# Patient Record
Sex: MALE | Race: WHITE
[De-identification: names, ages, dates, MRNs, and addresses within clinical notes are randomized per-mention and may not be internally consistent; named-entity substitution may affect disease eponyms.]

---

## 2017-12-06 ENCOUNTER — HOSPITAL ENCOUNTER (INPATIENT)
Dept: HOSPITAL 17 - NEPE | Age: 82
LOS: 3 days | Discharge: NURSING, REHAB OR LONG-TERM CARE | DRG: 470 | End: 2017-12-09
Attending: HOSPITALIST | Admitting: HOSPITALIST
Payer: SELF-PAY

## 2017-12-06 VITALS — HEIGHT: 68 IN | BODY MASS INDEX: 19.01 KG/M2 | WEIGHT: 125.44 LBS

## 2017-12-06 VITALS
RESPIRATION RATE: 18 BRPM | HEART RATE: 77 BPM | OXYGEN SATURATION: 100 % | SYSTOLIC BLOOD PRESSURE: 142 MMHG | DIASTOLIC BLOOD PRESSURE: 71 MMHG

## 2017-12-06 VITALS — HEART RATE: 96 BPM

## 2017-12-06 DIAGNOSIS — S72.012A: Primary | ICD-10-CM

## 2017-12-06 DIAGNOSIS — F03.90: ICD-10-CM

## 2017-12-06 DIAGNOSIS — Z91.81: ICD-10-CM

## 2017-12-06 DIAGNOSIS — Z95.1: ICD-10-CM

## 2017-12-06 DIAGNOSIS — I48.1: ICD-10-CM

## 2017-12-06 DIAGNOSIS — Z86.73: ICD-10-CM

## 2017-12-06 LAB
ANION GAP SERPL CALC-SCNC: 7 MEQ/L (ref 5–15)
BASOPHILS # BLD AUTO: 0.1 TH/MM3 (ref 0–0.2)
BASOPHILS NFR BLD: 2.3 % (ref 0–2)
BUN SERPL-MCNC: 16 MG/DL (ref 7–18)
CHLORIDE SERPL-SCNC: 107 MEQ/L (ref 98–107)
EOSINOPHIL # BLD: 0.3 TH/MM3 (ref 0–0.4)
EOSINOPHIL NFR BLD: 5.2 % (ref 0–4)
ERYTHROCYTE [DISTWIDTH] IN BLOOD BY AUTOMATED COUNT: 14.8 % (ref 11.6–17.2)
GFR SERPLBLD BASED ON 1.73 SQ M-ARVRAT: 74 ML/MIN (ref 89–?)
HCO3 BLD-SCNC: 28.2 MEQ/L (ref 21–32)
HCT VFR BLD CALC: 32.5 % (ref 39–51)
HEMO FLAGS: (no result)
INR PPP: 1.1 RATIO
LYMPHOCYTES # BLD AUTO: 1.6 TH/MM3 (ref 1–4.8)
LYMPHOCYTES NFR BLD AUTO: 26.1 % (ref 9–44)
MCH RBC QN AUTO: 30.9 PG (ref 27–34)
MCHC RBC AUTO-ENTMCNC: 33.2 % (ref 32–36)
MCV RBC AUTO: 92.9 FL (ref 80–100)
MONOCYTES NFR BLD: 10.6 % (ref 0–8)
NEUTROPHILS # BLD AUTO: 3.4 TH/MM3 (ref 1.8–7.7)
NEUTROPHILS NFR BLD AUTO: 55.8 % (ref 16–70)
PLATELET # BLD: 216 TH/MM3 (ref 150–450)
POTASSIUM SERPL-SCNC: 4.6 MEQ/L (ref 3.5–5.1)
PROTHROMBIN TIME: 11.3 SEC (ref 9.8–11.6)
RBC # BLD AUTO: 3.49 MIL/MM3 (ref 4.5–5.9)
SODIUM SERPL-SCNC: 142 MEQ/L (ref 136–145)
WBC # BLD AUTO: 6.1 TH/MM3 (ref 4–11)

## 2017-12-06 PROCEDURE — 88305 TISSUE EXAM BY PATHOLOGIST: CPT

## 2017-12-06 PROCEDURE — 80048 BASIC METABOLIC PNL TOTAL CA: CPT

## 2017-12-06 PROCEDURE — 70450 CT HEAD/BRAIN W/O DYE: CPT

## 2017-12-06 PROCEDURE — 71010: CPT

## 2017-12-06 PROCEDURE — 85018 HEMOGLOBIN: CPT

## 2017-12-06 PROCEDURE — 86900 BLOOD TYPING SEROLOGIC ABO: CPT

## 2017-12-06 PROCEDURE — L1830 KO IMMOB CANVAS LONG PRE OTS: HCPCS

## 2017-12-06 PROCEDURE — 73502 X-RAY EXAM HIP UNI 2-3 VIEWS: CPT

## 2017-12-06 PROCEDURE — 82306 VITAMIN D 25 HYDROXY: CPT

## 2017-12-06 PROCEDURE — 93005 ELECTROCARDIOGRAM TRACING: CPT

## 2017-12-06 PROCEDURE — 86901 BLOOD TYPING SEROLOGIC RH(D): CPT

## 2017-12-06 PROCEDURE — 88311 DECALCIFY TISSUE: CPT

## 2017-12-06 PROCEDURE — 85014 HEMATOCRIT: CPT

## 2017-12-06 PROCEDURE — 85025 COMPLETE CBC W/AUTO DIFF WBC: CPT

## 2017-12-06 PROCEDURE — 85610 PROTHROMBIN TIME: CPT

## 2017-12-06 PROCEDURE — 86850 RBC ANTIBODY SCREEN: CPT

## 2017-12-06 RX ADMIN — Medication SCH ML: at 21:23

## 2017-12-06 NOTE — RADRPT
EXAM DATE/TIME:  12/06/2017 19:32 

 

[facility] COMPARISON:     

No previous studies available for comparison.

 

                     

INDICATIONS :     

Chest pain. 

                     

 

MEDICAL HISTORY :     

None.          

 

SURGICAL HISTORY :     

CABG.   

 

ENCOUNTER:     

Initial                                        

 

ACUITY:     

1 day      

 

PAIN SCORE:     

1/10

 

LOCATION:     

Bilateral chest 

 

FINDINGS:     

The heart is enlarged. Median sternotomy wires are noted status post cardiac surgery. The pulmonary v
ascular pattern is normal. The lungs are clear.

 

CONCLUSION:     

1. Cardiomegaly. 

2. No focal infiltrate or pulmonary vascular congestion. 

 

 

 Nigel Siddiqui MD on December 06, 2017 at 19:48           

Board Certified Radiologist.

 This report was verified electronically.
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
## 2017-12-06 NOTE — RADRPT
EXAM DATE/TIME:  12/06/2017 19:29 

 

[facility] COMPARISON:     

No previous studies available for comparison.

 

                     

INDICATIONS :     

Left hip pain. Unknown cause of injury. 

                     

 

MEDICAL HISTORY :     

None.          

 

SURGICAL HISTORY :     

CABG.   

 

ENCOUNTER:     

Initial                                        

 

ACUITY:     

1 day      

 

PAIN SCORE:     

10/10

 

LOCATION:     

Left  hip. 

 

FINDINGS:     

There is an acute subcapital fracture involving the left proximal femur. Aortic stent graft is noted.


 

CONCLUSION:     Acute subcapital fracture involving the left proximal femur. 

 

 

 Nigel Siddiqui MD on December 06, 2017 at 19:49           

Board Certified Radiologist.

 This report was verified electronically.

## 2017-12-06 NOTE — RADRPT
EXAM DATE/TIME:  12/06/2017 20:06 

 

[facility] COMPARISON:     

No previous studies available for comparison.

 

 

INDICATIONS :     

Altered mental status.

                      

 

RADIATION DOSE:     

56.35 CTDIvol (mGy) 

 

 

 

MEDICAL HISTORY :     

None  

 

SURGICAL HISTORY :      

None. 

 

ENCOUNTER:      

Initial

 

ACUITY:      

1 day

 

PAIN SCALE:      

0/10

 

LOCATION:        

cranial 

 

TECHNIQUE:     

Multiple contiguous axial images were obtained of the head.  Using automated exposure control and adj
ustment of the mA and/or kV according to patient size, radiation dose was kept as low as reasonably a
chievable to obtain optimal diagnostic quality images.   DICOM format image data is available electro
nically for review and comparison.  

 

FINDINGS:     

 

CEREBRUM:     

Diffuse cerebral atrophy is noted. Moderate periventricular and subcortical white matter small vessel
 ischemic changes are noted bilaterally. 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 No
 evidence of midline shift, mass lesion, hemorrhage or acute infarction.  No extra-axial fluid collec
tions are seen.

 

POSTERIOR FOSSA:     

The cerebellum and brainstem are intact.  The 4th ventricle is midline.  The cerebellopontine angle i
s unremarkable.

 

EXTRACRANIAL:     

The visualized portion of the orbits is intact.

 

SKULL:     

The calvaria is intact.  No evidence of skull fracture.

 

CONCLUSION:     

1. Diffuse cerebral atrophy. 

2. Moderate periventricular and subcortical white matter small vessel ischemic changes bilaterally. 

3. 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 

4. No acute infarct, acute hemorrhage, mass effect or extra axial fluid collections. 

 

 

 Nigel Siddiqui MD on December 06, 2017 at 20:20           

Board Certified Radiologist.

 This report was verified electronically.

## 2017-12-07 VITALS
SYSTOLIC BLOOD PRESSURE: 110 MMHG | OXYGEN SATURATION: 98 % | TEMPERATURE: 95.2 F | RESPIRATION RATE: 17 BRPM | DIASTOLIC BLOOD PRESSURE: 72 MMHG | HEART RATE: 94 BPM

## 2017-12-07 VITALS
RESPIRATION RATE: 18 BRPM | SYSTOLIC BLOOD PRESSURE: 125 MMHG | OXYGEN SATURATION: 96 % | TEMPERATURE: 96.8 F | DIASTOLIC BLOOD PRESSURE: 75 MMHG | HEART RATE: 96 BPM

## 2017-12-07 VITALS
SYSTOLIC BLOOD PRESSURE: 138 MMHG | RESPIRATION RATE: 17 BRPM | HEART RATE: 137 BPM | OXYGEN SATURATION: 94 % | TEMPERATURE: 96.3 F | DIASTOLIC BLOOD PRESSURE: 72 MMHG

## 2017-12-07 VITALS
HEART RATE: 76 BPM | DIASTOLIC BLOOD PRESSURE: 59 MMHG | TEMPERATURE: 96 F | OXYGEN SATURATION: 99 % | SYSTOLIC BLOOD PRESSURE: 107 MMHG | RESPIRATION RATE: 17 BRPM

## 2017-12-07 VITALS
SYSTOLIC BLOOD PRESSURE: 118 MMHG | TEMPERATURE: 96.7 F | OXYGEN SATURATION: 96 % | HEART RATE: 89 BPM | DIASTOLIC BLOOD PRESSURE: 74 MMHG | RESPIRATION RATE: 18 BRPM

## 2017-12-07 VITALS
DIASTOLIC BLOOD PRESSURE: 69 MMHG | HEART RATE: 98 BPM | SYSTOLIC BLOOD PRESSURE: 115 MMHG | OXYGEN SATURATION: 95 % | RESPIRATION RATE: 18 BRPM | TEMPERATURE: 98.8 F

## 2017-12-07 LAB
ANION GAP SERPL CALC-SCNC: 7 MEQ/L (ref 5–15)
BASOPHILS # BLD AUTO: 0.1 TH/MM3 (ref 0–0.2)
BASOPHILS NFR BLD: 1.7 % (ref 0–2)
BUN SERPL-MCNC: 12 MG/DL (ref 7–18)
CHLORIDE SERPL-SCNC: 109 MEQ/L (ref 98–107)
EOSINOPHIL # BLD: 0.4 TH/MM3 (ref 0–0.4)
EOSINOPHIL NFR BLD: 6.6 % (ref 0–4)
ERYTHROCYTE [DISTWIDTH] IN BLOOD BY AUTOMATED COUNT: 15.2 % (ref 11.6–17.2)
GFR SERPLBLD BASED ON 1.73 SQ M-ARVRAT: 106 ML/MIN (ref 89–?)
HCO3 BLD-SCNC: 26.3 MEQ/L (ref 21–32)
HCT VFR BLD CALC: 29.1 % (ref 39–51)
HEMO FLAGS: (no result)
LYMPHOCYTES # BLD AUTO: 1.1 TH/MM3 (ref 1–4.8)
LYMPHOCYTES NFR BLD AUTO: 20.9 % (ref 9–44)
MCH RBC QN AUTO: 32 PG (ref 27–34)
MCHC RBC AUTO-ENTMCNC: 34.5 % (ref 32–36)
MCV RBC AUTO: 92.8 FL (ref 80–100)
MONOCYTES NFR BLD: 8.9 % (ref 0–8)
NEUTROPHILS # BLD AUTO: 3.3 TH/MM3 (ref 1.8–7.7)
NEUTROPHILS NFR BLD AUTO: 61.9 % (ref 16–70)
PLATELET # BLD: 188 TH/MM3 (ref 150–450)
POTASSIUM SERPL-SCNC: 3.9 MEQ/L (ref 3.5–5.1)
RBC # BLD AUTO: 3.14 MIL/MM3 (ref 4.5–5.9)
SODIUM SERPL-SCNC: 142 MEQ/L (ref 136–145)
WBC # BLD AUTO: 5.4 TH/MM3 (ref 4–11)

## 2017-12-07 PROCEDURE — 0SRB01A REPLACEMENT OF LEFT HIP JOINT WITH METAL SYNTHETIC SUBSTITUTE, UNCEMENTED, OPEN APPROACH: ICD-10-PCS | Performed by: ORTHOPAEDIC SURGERY

## 2017-12-07 RX ADMIN — CLINDAMYCIN PHOSPHATE SCH MLS/HR: 150 INJECTION, SOLUTION INTRAMUSCULAR; INTRAVENOUS at 18:05

## 2017-12-07 RX ADMIN — Medication SCH ML: at 09:00

## 2017-12-07 RX ADMIN — METOPROLOL TARTRATE SCH MG: 25 TABLET, FILM COATED ORAL at 20:37

## 2017-12-07 RX ADMIN — Medication SCH ML: at 20:44

## 2017-12-07 RX ADMIN — SENNOSIDES SCH MG: 8.6 TABLET, FILM COATED ORAL at 20:38

## 2017-12-07 RX ADMIN — CALCIUM CARBONATE-CHOLECALCIFEROL TAB 250 MG-125 UNIT SCH MG: 250-125 TAB at 20:38

## 2017-12-07 RX ADMIN — MIRTAZAPINE SCH MG: 15 TABLET, FILM COATED ORAL at 20:38

## 2017-12-07 RX ADMIN — HYDROCODONE BITARTRATE AND ACETAMINOPHEN PRN TAB: 5; 325 TABLET ORAL at 16:22

## 2017-12-07 RX ADMIN — DONEPEZIL HYDROCHLORIDE SCH MG: 5 TABLET, FILM COATED ORAL at 20:39

## 2017-12-07 NOTE — RADRPT
EXAM DATE/TIME:  12/07/2017 12:56 

 

[facility] COMPARISON:     HIP LEFT (AP&LAT 2/3VWS) W AP PELVIS, December 06, 2017, 19:29.

 

                     

INDICATIONS :     Post op left hip surgery

                     

MEDICAL HISTORY :     None.          

SURGICAL HISTORY :     None.   

ENCOUNTER:     Initial                                        

ACUITY:     1 day      

PAIN SCORE:     0/10

LOCATION:     Left  hip and pelvis

 

FINDINGS:

Left hip arthroplasty is present. The alignment is anatomic. There is no evidence of acute fracture. 
 

 

CONCLUSION:     Postsurgical changes as above.

 

 Fco Cotto MD on December 07, 2017 at 16:15           

Board Certified Radiologist.

 This report was verified electronically.

## 2017-12-07 NOTE — HHI.PR
Addendum to Inpatient Note


Addendum Reason:  Additional Documentation


Additional Information


Patient is very confused and demented. as per documentation, the patient was 

dropped into [facility] for evaluation by DCF. At the moment the 

patient is very confused, denies any prior medical history although he has a 

visible midline sternal scar.  Lungs are clear to auscultation bilaterally, 

abdomen is soft, nontender, nondistended.  There is pain elicited upon 

palpation of the left hip.  The patient has a subcapital left femoral fracture 

for which orthopedic surgery has been consulted and recommended surgical 

repair.  Given the high morbidity and mortality associated with hip fractures, 

it is of medical necessity for the patient to have the fracture repaired.  

Signed consent on behalf of the patient.











Jules Tuttle MD Dec 7, 2017 09:10

## 2017-12-07 NOTE — PD.ORT.PN
Subjective


Subjective Remarks


Fall at skilled nursing facility. Left hip pain and is unable to ambulate. 

Patient is very confused





Objective


Vitals





Vital Signs








  Date Time  Temp Pulse Resp B/P (MAP) Pulse Ox O2 Delivery O2 Flow Rate FiO2


 


12/7/17 04:25 96.7 89 18 118/74 (89) 96   


 


12/7/17 00:15 96.8 96 18 125/75 (92) 96   


 


12/7/17 00:06        


 


12/6/17 20:01  77 18 145/71 (95) 100 Room Air  





    142/68 (92)    


 


12/6/17 20:01     100 Room Air  


 


12/6/17 20:01     100 Room Air  














I/O      


 


 12/6/17 12/6/17 12/6/17 12/7/17 12/7/17 12/7/17





 07:00 15:00 23:00 07:00 15:00 23:00


 


Intake Total   500 ml 0 ml  


 


Balance   500 ml 0 ml  


 


      


 


Intake Oral    0 ml  


 


IV Total   500 ml   


 


# Voids    2  


 


# Bowel Movements    1  








Result Diagram:  


12/6/17 2000 12/6/17 2000





Other Results





Laboratory Tests








Test


  12/6/17


20:00


 


Prothromb Time International


Ratio 1.1 RATIO 


 


 


Prothrombin Time


  11.3 SEC


(9.8-11.6)








Imaging





Last 24 hours Impressions








Chest X-Ray 12/6/17 1910 Signed





Impressions: 





 Service Date/Time:  Wednesday, December 6, 2017 19:32 - CONCLUSION:  1. 





 Cardiomegaly.  2. No focal infiltrate or pulmonary vascular congestion.     





 Nigel Siddiqui MD 








Objective Remarks


Bilateral upper extremities: Full range of motion neurovascularly intact


Right lower extremity: Full range of motion neurovascularly intact


Left lower extremity: Pain to palpation of hip. No pain with knee or ankle 

motion. Still has house identification band on left leg area distally intact 

sensation good capillary refills





Assessment & Plan


Assessment and Plan


Left femoral neck fracture


Nothing by mouth


Surgery this morning for left hip hemiarthroplasty


Nursing to find consents. May need secondary DrAlfonzo consents for medical 

necessary. Surgery this morning with Dr. Rama Huber,Mina CORTEZ Dec 7, 2017 06:50

## 2017-12-07 NOTE — MB
cc:

MARKO SALAZAR

****

 

 

DATE OF CONSULTATION

12/7/2017

 

REASON FOR CONSULTATION

Displaced left femoral neck fracture.

 

CONSULTING PHYSICIAN

Dr. Chester

 

CITLALLI Manning is an 89-year-old male who had been living at home alone.  He was

subsequently taken to a rehab center recently because of difficulty managing

himself at home.  He does have some possible early dementia.  The patient was

found to have left hip pain with some deformity of his left leg.  He was

brought to the emergency room.  He was found to have a displaced left femoral

neck fracture.  He complains of left hip pain.  Pain is worse with movement.

He has had difficulty ambulating.

 

PAST MEDICAL HISTORY

Past medical history from the patient is unreliable.  He has some confusion.

He denies any significant medical problems.

 

SURGERIES

The patient does not recall any surgery.

 

ALLERGIES

PENICILLIN

 

MEDICATIONS

Please see EMR for inpatient medications.

 

SOCIAL HISTORY

[place] originally lived in [place] The patient denies

alcohol, tobacco or drug use.

 

FAMILY HISTORY

Positive for asthma in a brother.

 

REVIEW OF SYSTEMS

The patient denies headache, visual changes, neck pain, chest pain, shortness

of breath, abdominal pain, nausea, vomiting or recent weight loss, bowel or

bladder incontinence, numbness or  tingling of extremities.  He complains of

left hip pain.  He has difficulty ambulating secondary to his hip pain.

 

PHYSICAL EXAMINATION

The patient is a thin 89-year-old male.  He is awake.  He answers questions,

but his answers are not completely reliable.  He appears well-developed,

well-nourished.

VITAL SIGNS:  Temperature 96.0, pulse 76, respirations 17, blood pressure

107/59, O2 sat 99% on room air.

HEAD:  The patient is normocephalic.

EYES:  Pupils are equal.

NECK:  Soft, nontender.  Trachea is midline.

ABDOMEN:  Soft, nontender, nondistended.

EXTREMITIES:  Examination of the bilateral upper extremities reveals no pain

with shoulder, elbow or wrist motion.  He has intact sensation in all fingers.

He has good cap refill in all fingers.  Skin is intact.  Radial pulses are

palpable.  Examination of the right leg reveals no pain with hip, knee or ankle

motion.  Skin is intact.  Dorsalis pedis pulse is palpable.  Sensation intact.

Examination of the left leg reveals that the leg is shortened.  He has pain

with any hip motion.  He has minimal tenderness around his knee, tibia, or

ankle.  Skin is intact.  Dorsalis pulse is palpable.

 

X-RAYS

X-rays of left hip were reviewed.  X-rays reveal a displaced left femoral neck

fracture.

 

IMPRESSION

1. Displaced left femoral neck fracture.

2. Early dementia.

 

PLAN

Treatment options were discussed with the patient.  At this point, I would

recommend left hip hemiarthroplasty.  The risks of surgery include bleeding,

infection, injury to arteries, nerves and blood vessels, hip dislocation, leg

length discrepancy, femur fracture, as well as medical complications including

blood clot, stroke, heart attack and death.  All questions were answered.  I

will plan on surgery today.

 

A mid-level provider in my office, nurse practitioner or PA, may see this

patient on a follow-up basis and continue to implement the objective of this

plan including: Starting or adjusting medications, injections of muscle,

tendon, bursa or joints, cast application, orthotic or brace application,

physical therapy, further radiographic studies including x-ray, MRI, CT,

 

 

ultrasounds or bone scan, vascular studies, neurologic studies, or other

specialist consultations, and proceeding with surgical management as

appropriate.

 

 

 

 

 

                              _________________________________

                              MD HETAL Chopra/KRISS

D:  12/7/2017/8:56 AM

T:  12/7/2017/9:09 AM

Visit #:  [id number]

Job #:  [id number]

## 2017-12-07 NOTE — EKG
Date Performed: 12/06/2017       Time Performed: 19:46:52

 

PTAGE:      89 years

 

EKG:      ATRIAL FIBRILLATION MARKED LEFT AXIS DEVIATION LOW QRS VOLTAGE IN EXTREMITY LEADS Poor R wa
ve progression. Consider anterolateral ischemia. 

 

NO PREVIOUS TRACING            

 

DOCTOR:   Alonso Garzon  Interpretating Date/Time  12/07/2017 17:46:58

## 2017-12-07 NOTE — PD.OP
cc:   Oz Ontiveros MD


__________________________________________________





Operative Report


Date of Surgery:  Dec 7, 2017


Preoperative Diagnosis:  


Displaced left femoral neck fracture


Postoperative Diagnosis:  


Procedure:


Left hip bipolar hemiarthroplasty


Anesthesia:


Gen.


Surgeon:


Oz Ontiveros


Assistant(s):


IRAIS Landry PA-C


 


The surgical procedure was assisted by my physician assistant. My P.A. presence 

was necessary throughout this case for the manipulation and positioning of the 

surgical extremity. My P.A. was assisting me throughout the duration of this 

procedure. The skill set of a physician assistant was medically necessary to 

complete this procedure. During the surgical case the surgical tech was working 

at the back table and the physician assistant was directly assisting me.


Operation and Findings:


PLAN OF ACTIVITY


Weight bear as tolerated.





IMPLANTS USED


DePuy Corail size [15] stem with size [50] bipolar head and [+1] neck.


 


DRAIN:


7 mm Evans-Sweeney drain





DETAILS OF PROCEDURE


This patient was brought into the operating room and placed on the OR table. 

The patient was given anesthesia. The patient received IV antibiotics. The 

patient was then placed in lateral decubitus position. The hip and leg were 

prepped with alcohol, followed by Hibiclens and draped in a usual sterile 

fashion. Clean air was used for this procedure. Time out procedure was 

performed. The procedure began with a 5 inch incision over the posterolateral 

hip. The subcutaneous tissue was dissected with the Bovie. The iliotibial band 

were split in line with fibers. The Charnley retractor was placed. The 

piriformis and external rotators were released from the femur and tagged with a 

#1 Vicryl suture. The capsule is now incised and tagged with #1 Vicryl. The 

femoral neck fracture was now visualized. A corkscrew was now used to remove 

the femoral head. The femoral head was sized and measured. Soft tissue was now 

protected. The hip skid was placed underneath the femoral neck. An oscillating 

saw was used to make a femoral neck cut.





At this point attention was turned to preparation of the proximal femur. A box 

osteotome was used to remove the lateral cortex of the femoral neck. The T-

handle reamer was used to open the femoral canal. Next, the canal was broached. 

A lateralizing reamer was used to help lateralize the prosthesis. At this point 

a trial head and neck were placed. The hip was reduced. The patient was found 

to have excellent stability with good range of motion. Trial components were 

removed. Soft tissue and bone were thoroughly irrigated. A Corail stem was now 

opened. The stem was now impacted into the proximal femur. Care was taken to 

keep appropriate anteversion. The head and neck were now impacted onto the 

stem. The hip was again reduced. The hip was found to have good range of motion 

and good stability. Leg lengths were clinically equal. The wound was thoroughly 

irrigated. The capsule, piriformis and iliotibial band were closed with #1 

Vicryl. Subcutaneous tissue was closed with 3-0 Vicryl. The skin was closed 

with Prineo. A sterile dressing was applied with Primapore. The patient was 

placed into a knee immobilizer. The patient was awakened and transferred to the 

recovery room in stable condition. Needle and sponge counts were correct.











Oz Ontiveros MD Dec 7, 2017 11:01

## 2017-12-07 NOTE — HHI.HP
__________________________________________________





[facility]


Service


[facility]


Primary Care Physician


Tyson Maxwell M.D.


Admission Diagnosis





hip fracture, confusion, possible dementia


Diagnoses:  


Travel History


International Travel<30 Days:  No


Contact w/Intl Traveler <30 Da:  No


Traveled to Known Affected Are:  No


History of Present Illness


left hip top of hip pain 


said he fell about a month ago


denies passing out 


denies any other symptoms apart from pain





Past Family Social History


Past Medical History


denies any med problems


Past Surgical History


"think i had a slight heart sx"


Allergies:  


Coded Allergies:  


     Penicillins (Verified  Allergy, Severe, 17)


Family History


brother with asthma


Social History


"always been a fit man, plays a lot of sport" played football and cricket 


never smoked 


no drinking etoh heavily


no drugs





comes from [place], still thinks he lives with his wife, moved to [place] 4 yrs ago, 

has 3 children





Physical Exam


Vital Signs





Vital Signs








  Date Time  Temp Pulse Resp B/P (MAP) Pulse Ox O2 Delivery O2 Flow Rate FiO2


 


17 00:15 96.8 96 18 125/75 (92) 96   


 


17 00:06        


 


17 20:01  77 18 145/71 (95) 100 Room Air  





    142/68 (92)    


 


17 20:01     100 Room Air  


 


17 20:01     100 Room Air  








Physical Exam


GENERAL: This is a well-nourished, well-developed patient, in no apparent 

distress.


SKIN: No rashes, ecchymoses or lesions. Cool and dry.


HEAD: Atraumatic. Normocephalic. No temporal or scalp tenderness.


EYES:No scleral icterus. No injection or drainage. 


ENT: Nose without bleeding, purulent drainage or septal hematoma. Airway patent.


NECK: Trachea midline. No JVD o


CARDIOVASCULAR: Regular rate and rhythm without  gallops, or rubs. midlline 

sternal scar. systolic murmur at mitral area with radiation to axila


RESPIRATORY: Clear to auscultation. Breath sounds equal bilaterally. No wheezes

, rales, or rhonchi.  


GASTROINTESTINAL: Abdomen soft, non-tender, nondistended.No guarding.


MUSCULOSKELETAL: Extremities without clubbing, cyanosis, or edema.  No calf 

tenderness. 


NEUROLOGICAL: Awake and alert left LE slightly shorter and internally rotated. 

Normal speech.


Laboratory





Laboratory Tests








Test


  17


20:00


 


White Blood Count 6.1 


 


Red Blood Count 3.49 


 


Hemoglobin 10.8 


 


Hematocrit 32.5 


 


Mean Corpuscular Volume 92.9 


 


Mean Corpuscular Hemoglobin 30.9 


 


Mean Corpuscular Hemoglobin


Concent 33.2 


 


 


Red Cell Distribution Width 14.8 


 


Platelet Count 216 


 


Mean Platelet Volume 6.8 


 


Neutrophils (%) (Auto) 55.8 


 


Lymphocytes (%) (Auto) 26.1 


 


Monocytes (%) (Auto) 10.6 


 


Eosinophils (%) (Auto) 5.2 


 


Basophils (%) (Auto) 2.3 


 


Neutrophils # (Auto) 3.4 


 


Lymphocytes # (Auto) 1.6 


 


Monocytes # (Auto) 0.6 


 


Eosinophils # (Auto) 0.3 


 


Basophils # (Auto) 0.1 


 


CBC Comment DIFF FINAL 


 


Differential Comment  


 


Prothrombin Time 11.3 


 


Prothromb Time International


Ratio 1.1 


 


 


Blood Urea Nitrogen 16 


 


Creatinine 0.96 


 


Random Glucose 86 


 


Calcium Level 8.7 


 


Sodium Level 142 


 


Potassium Level 4.6 


 


Chloride Level 107 


 


Carbon Dioxide Level 28.2 


 


Anion Gap 7 


 


Estimat Glomerular Filtration


Rate 74 


 








Result Diagram:  


17








Caprini VTE Risk Assessment


Caprini VTE Risk Assessment:  Mod/High Risk (score >= 2)


Caprini Risk Assessment Model











 Point Value = 1          Point Value = 2  Point Value = 3  Point Value = 5


 


Age 41-60


Minor surgery


BMI > 25 kg/m2


Swollen legs


Varicose veins


Pregnancy or postpartum


History of unexplained or recurrent


   spontaneous 


Oral contraceptives or hormone


   replacement


Sepsis (< 1 month)


Serious lung disease, including


   pneumonia (< 1 month)


Abnormal pulmonary function


Acute myocardial infarction


Congestive heart failure (< 1 month)


History of inflammatory bowel disease


Medical patient at bed rest Age 61-74


Arthroscopic surgery


Major open surgery (> 45 min)


Laparoscopic surgery (> 45 min)


Malignancy


Confined to bed (> 72 hours)


Immobilizing plaster cast


Central venous access Age >= 75


History of VTE


Family history of VTE


Factor V Leiden


Prothrombin [id number]


Lupus anticoagulant


Anticardiolipin antibodies


Elevated serum homocysteine


Heparin-induced thrombocytopenia


Other congenital or acquired


   thrombophilia Stroke (< 1 month)


Elective arthroplasty


Hip, pelvis, or leg fracture


Acute spinal cord injury (< 1 month)








Prophylaxis Regimen











   Total Risk


Factor Score Risk Level Prophylaxis Regimen


 


0-1      Low Early ambulation


 


2 Moderate Order ONE of the following:


*Sequential Compression Device (SCD)


*Heparin 5000 units SQ BID


 


3-4 Higher Order ONE of the following medications:


*Heparin 5000 units SQ TID


*Enoxaparin/Lovenox 40 mg SQ daily (WT < 150 kg, CrCl > 30 mL/min)


*Enoxaparin/Lovenox 30 mg SQ daily (WT < 150 kg, CrCl > 10-29 mL/min)


*Enoxaparin/Lovenox 30 mg SQ BID (WT < 150 kg, CrCl > 30 mL/min)


AND/OR


*Sequential Compression Device (SCD)


 


5 or more Highest Order ONE of the following medications:


*Heparin 5000 units SQ TID (Preferred with Epidurals)


*Enoxaparin/Lovenox 40 mg SQ daily (WT < 150 kg, CrCl > 30 mL/min)


*Enoxaparin/Lovenox 30 mg SQ daily (WT < 150 kg, CrCl > 10-29 mL/min)


*Enoxaparin/Lovenox 30 mg SQ BID (WT < 150 kg, CrCl > 30 mL/min)


AND


*Sequential Compression Device (SCD)











Assessment and Plan


Assessment and Plan


Impression: 





hip fx - unknown when he fell


dementia


afib on ekg- rate controlled- possibly new








Plan: 





ortho consult


pain control 


reorientation 


will monitor on tele 


not a candidate for full anticogaulation for afib due to advanced dementia and 

possibility of freq falls (though we dont know for sure if he falls frequently 

at this point)


will need to address this as outpatient





Physician Certification


Order for Inpatient Services


The services are ordered in accordance with Medicare regulations or non-

Medicare payer requirements, as applicable.  In the case of services not 

specified as inpatient-only, they are appropriately provided as inpatient 

services in accordance with the 2-midnight benchmark.


 days is the estimated time the patient will need to remain in the hospital, 

assuming treatment plan goals are met and no additional complications.











Tiera Chester MD Dec 7, 2017 02:31

## 2017-12-08 VITALS
DIASTOLIC BLOOD PRESSURE: 59 MMHG | SYSTOLIC BLOOD PRESSURE: 116 MMHG | RESPIRATION RATE: 18 BRPM | TEMPERATURE: 98.5 F | OXYGEN SATURATION: 99 % | HEART RATE: 79 BPM

## 2017-12-08 VITALS
OXYGEN SATURATION: 95 % | HEART RATE: 86 BPM | SYSTOLIC BLOOD PRESSURE: 122 MMHG | TEMPERATURE: 97.9 F | RESPIRATION RATE: 18 BRPM | DIASTOLIC BLOOD PRESSURE: 62 MMHG

## 2017-12-08 VITALS
DIASTOLIC BLOOD PRESSURE: 64 MMHG | HEART RATE: 90 BPM | TEMPERATURE: 98.1 F | SYSTOLIC BLOOD PRESSURE: 100 MMHG | RESPIRATION RATE: 16 BRPM | OXYGEN SATURATION: 97 %

## 2017-12-08 VITALS
RESPIRATION RATE: 18 BRPM | OXYGEN SATURATION: 100 % | TEMPERATURE: 97.2 F | HEART RATE: 99 BPM | DIASTOLIC BLOOD PRESSURE: 62 MMHG | SYSTOLIC BLOOD PRESSURE: 130 MMHG

## 2017-12-08 VITALS
TEMPERATURE: 97.7 F | RESPIRATION RATE: 16 BRPM | DIASTOLIC BLOOD PRESSURE: 63 MMHG | HEART RATE: 88 BPM | OXYGEN SATURATION: 97 % | SYSTOLIC BLOOD PRESSURE: 123 MMHG

## 2017-12-08 VITALS
DIASTOLIC BLOOD PRESSURE: 60 MMHG | OXYGEN SATURATION: 98 % | HEART RATE: 94 BPM | RESPIRATION RATE: 16 BRPM | SYSTOLIC BLOOD PRESSURE: 97 MMHG | TEMPERATURE: 97 F

## 2017-12-08 VITALS — OXYGEN SATURATION: 96 %

## 2017-12-08 LAB
HCT VFR BLD CALC: 29.6 % (ref 39–51)
REVIEW FLAG: (no result)

## 2017-12-08 RX ADMIN — CALCIUM CARBONATE-CHOLECALCIFEROL TAB 250 MG-125 UNIT SCH MG: 250-125 TAB at 09:03

## 2017-12-08 RX ADMIN — HYDROCODONE BITARTRATE AND ACETAMINOPHEN PRN TAB: 5; 325 TABLET ORAL at 12:55

## 2017-12-08 RX ADMIN — CLINDAMYCIN PHOSPHATE SCH MLS/HR: 150 INJECTION, SOLUTION INTRAMUSCULAR; INTRAVENOUS at 09:03

## 2017-12-08 RX ADMIN — CLINDAMYCIN PHOSPHATE SCH MLS/HR: 150 INJECTION, SOLUTION INTRAMUSCULAR; INTRAVENOUS at 01:56

## 2017-12-08 RX ADMIN — METOPROLOL TARTRATE SCH MG: 25 TABLET, FILM COATED ORAL at 21:21

## 2017-12-08 RX ADMIN — METOPROLOL TARTRATE SCH MG: 25 TABLET, FILM COATED ORAL at 09:03

## 2017-12-08 RX ADMIN — Medication SCH ML: at 21:22

## 2017-12-08 RX ADMIN — DONEPEZIL HYDROCHLORIDE SCH MG: 5 TABLET, FILM COATED ORAL at 21:21

## 2017-12-08 RX ADMIN — CALCIUM CARBONATE-CHOLECALCIFEROL TAB 250 MG-125 UNIT SCH MG: 250-125 TAB at 21:21

## 2017-12-08 RX ADMIN — Medication SCH ML: at 09:00

## 2017-12-08 RX ADMIN — ENOXAPARIN SODIUM SCH MG: 30 INJECTION SUBCUTANEOUS at 12:54

## 2017-12-08 RX ADMIN — SENNOSIDES SCH MG: 8.6 TABLET, FILM COATED ORAL at 21:21

## 2017-12-08 RX ADMIN — MIRTAZAPINE SCH MG: 15 TABLET, FILM COATED ORAL at 21:21

## 2017-12-08 RX ADMIN — CHOLECALCIFEROL CAP 125 MCG (5000 UNIT) SCH UNITS: 125 CAP at 09:03

## 2017-12-08 RX ADMIN — VITAMIN D, TAB 1000IU (100/BT) SCH UNITS: 25 TAB at 09:03

## 2017-12-08 NOTE — HHI.PR
Subjective


Remarks


No major overnight events.


Patient denies pain.


Denies cp/sob.


awake - seems to be confused.





Objective


Vitals





Vital Signs








  Date Time  Temp Pulse Resp B/P (MAP) Pulse Ox O2 Delivery O2 Flow Rate FiO2


 


12/8/17 14:24     96   


 


12/8/17 11:54 97.9 86 18 122/62 (82) 95   


 


12/8/17 08:00 97.2 99 18 130/62 (84) 100   


 


12/8/17 04:00 98.1 90 16 100/64 (76) 97   


 


12/8/17 00:00 97.0 94 16 97/60 (72) 98   


 


12/7/17 19:20 98.8 98 18 115/69 (84) 95   














I/O      


 


 12/7/17 12/7/17 12/7/17 12/8/17 12/8/17 12/8/17





 07:00 15:00 23:00 07:00 15:00 23:00


 


Intake Total 0 ml 700 ml 240 ml 466 ml  


 


Output Total  550 ml 300 ml   


 


Balance 0 ml 150 ml -60 ml 466 ml  


 


      


 


Intake Oral 0 ml  240 ml 360 ml  


 


IV Total    106 ml  


 


Other  700 ml    


 


Output Urine Total  500 ml 300 ml   


 


Estimated Blood Loss  50 ml    


 


# Voids 2     


 


# Bowel Movements 1     








Result Diagram:  


12/8/17 0635                                                                   

             12/7/17 0654





Imaging





Last Impressions








Hip and Pelvis X-Ray 12/7/17 1058 Signed





Impressions: 





 Service Date/Time:  Thursday, December 7, 2017 12:56 - CONCLUSION: 

Postsurgical 





 changes as above.   Fco Cotto MD 


 


Chest X-Ray 12/6/17 1910 Signed





Impressions: 





 Service Date/Time:  Wednesday, December 6, 2017 19:32 - CONCLUSION:  1. 





 Cardiomegaly.  2. No focal infiltrate or pulmonary vascular congestion.     





 Nigel Siddiqui MD 


 


Head CT 12/6/17 0000 Signed





Impressions: 





 Service Date/Time:  Wednesday, December 6, 2017 20:06 - CONCLUSION:  1. 

Diffuse 





 cerebral atrophy.  2. Moderate periventricular and subcortical white matter 





 small vessel ischemic changes bilaterally.  3. 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 





 frontal infarcts.  4. No acute infarct, acute hemorrhage, mass effect or extra 





 axial fluid collections.     Nigel Siddiqui MD 








Objective Remarks


Awake and alert oriented to person


Clear lungs BL


Abdomen soft, nt, nd


S1S2 irregularly irregular, no MRG


left lower extremity on a cast. Pedal pulses present BL


Medications and IVs





Current Medications








 Medications


  (Trade)  Dose


 Ordered  Sig/[place]


 Route  Start Time


 Stop Time Status Last Admin


 


  (NS Flush)  2 ml  UNSCH  PRN


 IV FLUSH  12/6/17 20:45


     


 


 


  (NS Flush)  2 ml  BID


 IV FLUSH  12/6/17 21:00


     


 


 


  (Narcan Inj)  0.4 mg  UNSCH  PRN


 IV PUSH  12/6/17 20:45


     


 


 


  (Morphine Inj)  2 mg  Q3H  PRN


 IV PUSH  12/6/17 21:00


     


 


 


 Lactated Ringer's  1,000 ml @ 


 30 mls/hr  Q24H PRN


 IV  12/7/17 01:00


 12/10/17 00:59   


 


 


 Sodium Chloride  500 ml @ 


 30 mls/hr  [id number] PRN


 IV  12/7/17 01:00


 12/10/17 00:59   


 


 


  (Lopressor)  25 mg  ON CALL  PRN


 PO  12/7/17 01:00


 12/10/17 00:59   


 


 


  (Betadine 5%


 Antisepsis Kit)  1 applic  ON CALL  PRN


 EACH NARE  12/7/17 01:00


 12/10/17 00:59   


 


 


  (Chlorhexidine


 2% Cloth)  3 pack  ON CALL  PRN


 TOPICAL  12/7/17 01:00


 12/10/17 00:59   


 


 


  (NovoLIN R INJ)  See


 Protocol


 Table ...  ON CALL  PRN


 SQ  12/7/17 01:00


 12/10/17 00:59   


 


 


  (Lovenox Inj)  30 mg  Q24H


 SQ  12/8/17 12:00


    12/8/17 12:54


 


 


  (Norco  5-325 Mg)  1 tab  Q3H  PRN


 PO  12/7/17 11:00


    12/8/17 12:55


 


 


  (Morphine Inj)  3 mg  Q3H  PRN


 IV PUSH  12/7/17 11:00


     


 


 


  (Vitamin D3)  5,000 units  DAILY


 PO  12/8/17 09:00


    12/8/17 09:03


 


 


  (Vitamin D3)  2,000 units  DAILY


 PO  12/8/17 09:00


    12/8/17 09:03


 


 


  (Aricept)  10 mg  HS


 PO  12/7/17 21:00


    12/7/17 20:39


 


 


  (Lopressor)  25 mg  BID


 PO  12/7/17 21:00


    12/8/17 09:03


 


 


  (Remeron)  15 mg  HS


 PO  12/7/17 21:00


    12/7/17 20:38


 


 


  (Senokot)  8.6 mg  HS


 PO  12/7/17 21:00


    12/7/17 20:38


 


 


  (Oscal-D 250-125)  500 mg  BID


 PO  12/7/17 21:00


    12/8/17 09:03


 











A/P


Problem List:  


(1) Hip fracture, left


ICD Code:  S72.002A - Fracture of unspecified part of neck of left femur, 

initial encounter for closed fracture


Status:  Acute


Plan:  Postop day #1


That is post left hemiarthroplasty by orthopedic surgery.


Pain control as per orthopedic surgery recommendations.  Continue Norco and 

morphine sulfate IV as needed for pain.


As per orthopedic surgery weightbearing as tolerated with posterior hip 

precautions.





(2) Confused


ICD Code:  R41.0 - Disorientation, unspecified


Status:  Acute


Plan:  Patient seems to have possible dementia.  Awake and alert.


Follows commands.


Continue donepezil which the patient takes at home.





(3) A-fib


ICD Code:  I48.91 - Unspecified atrial fibrillation


Status:  Acute


Plan:  Rate controlled at this moment.  The patient previously on Xarelto 

however likely the patient is not a good candidate for anticoagulation at this 

time due to high risk of falls.


Continue metoprolol tartrate 25 minutes by mouth twice a day.





Assessment and Plan


DVT prophylaxis: As per orthopedic surgery recommendations.





Problem Qualifiers





(1) Hip fracture, left:  


Qualified Codes:  S72.002A - Fracture of unspecified part of neck of left femur

, initial encounter for closed fracture


(2) A-fib:  


Qualified Codes:  I48.1 - Persistent atrial fibrillation








Jules Tuttle MD Dec 8, 2017 17:25

## 2017-12-08 NOTE — PD.ORT.PN
Subjective


Subjective Remarks


Resting comfortably with no new complaints





Objective


Vitals





Vital Signs








  Date Time  Temp Pulse Resp B/P (MAP) Pulse Ox O2 Delivery O2 Flow Rate FiO2


 


12/8/17 04:00 98.1 90 16 100/64 (76) 97   


 


12/8/17 00:00 97.0 94 16 97/60 (72) 98   


 


12/7/17 19:20 98.8 98 18 115/69 (84) 95   


 


12/7/17 16:00 96.3 137 17 138/72 (94) 94   


 


12/7/17 13:35  77 16 99/54 (69) 98 Room Air  


 


12/7/17 13:35 95.2 94 17 110/72 (85) 98   


 


12/7/17 13:15  75 16 104/71 (82) 99 Room Air  


 


12/7/17 13:00  72 16 125/59 (81) 98 Room Air  


 


12/7/17 12:45  79 16 125/59 (81) 99 Room Air  


 


12/7/17 12:30  70 16 122/66 (84) 99 Nasal Cannula 2 


 


12/7/17 12:19 96.7 79 16 130/63 (85) 99 Nasal Cannula 2 


 


12/7/17 08:00 96.0 76 17 107/59 (75) 99   














I/O      


 


 12/7/17 12/7/17 12/7/17 12/8/17 12/8/17 12/8/17





 07:00 15:00 23:00 07:00 15:00 23:00


 


Intake Total 0 ml 700 ml 240 ml 360 ml  


 


Output Total  550 ml 300 ml   


 


Balance 0 ml 150 ml -60 ml 360 ml  


 


      


 


Intake Oral 0 ml  240 ml 360 ml  


 


Other  700 ml    


 


Output Urine Total  500 ml 300 ml   


 


Estimated Blood Loss  50 ml    


 


# Voids 2     


 


# Bowel Movements 1     








Result Diagram:  


12/7/17 0654                                                                   

             12/7/17 0654





Imaging





Last 24 hours Impressions








Chest X-Ray 12/6/17 1910 Signed





Impressions: 





 Service Date/Time:  Wednesday, December 6, 2017 19:32 - CONCLUSION:  1. 





 Cardiomegaly.  2. No focal infiltrate or pulmonary vascular congestion.     





 Nigel Siddiqui MD 








Objective Remarks


Bilateral upper extremities: Full range of motion neurovascularly intact


Right lower extremity: Full range of motion neurovascularly intact


Left lower extremity: Clean dry dressings intact. Knee immobilizer in place. 

Distally intact sensation good capillary refills. Active dorsal flexion plantar 

flexion of foot





Assessment & Plan


Assessment and Plan


Left hip hemiarthroplasty POD 1


Physical therapy weightbearing as tolerated with posterior hip precautions. 


 knee immobilizer while in bed


Lovenox


Case management for discharge planning to rehabilitation Saturday or Sunday


Dry dressings. Only changes saturated


Follow-up Dr. Ontiveros or PA in 2 weeks











Mina Huber Jr. Dec 8, 2017 06:44

## 2017-12-09 VITALS
TEMPERATURE: 98.7 F | SYSTOLIC BLOOD PRESSURE: 156 MMHG | RESPIRATION RATE: 18 BRPM | OXYGEN SATURATION: 98 % | HEART RATE: 109 BPM | DIASTOLIC BLOOD PRESSURE: 70 MMHG

## 2017-12-09 VITALS
TEMPERATURE: 98.7 F | RESPIRATION RATE: 18 BRPM | OXYGEN SATURATION: 98 % | HEART RATE: 111 BPM | SYSTOLIC BLOOD PRESSURE: 123 MMHG | DIASTOLIC BLOOD PRESSURE: 64 MMHG

## 2017-12-09 VITALS
TEMPERATURE: 98.8 F | OXYGEN SATURATION: 100 % | RESPIRATION RATE: 16 BRPM | SYSTOLIC BLOOD PRESSURE: 112 MMHG | HEART RATE: 78 BPM | DIASTOLIC BLOOD PRESSURE: 62 MMHG

## 2017-12-09 VITALS
TEMPERATURE: 98.9 F | DIASTOLIC BLOOD PRESSURE: 60 MMHG | SYSTOLIC BLOOD PRESSURE: 104 MMHG | RESPIRATION RATE: 18 BRPM | OXYGEN SATURATION: 97 % | HEART RATE: 87 BPM

## 2017-12-09 VITALS — HEART RATE: 120 BPM

## 2017-12-09 VITALS — OXYGEN SATURATION: 99 %

## 2017-12-09 RX ADMIN — ENOXAPARIN SODIUM SCH MG: 30 INJECTION SUBCUTANEOUS at 11:58

## 2017-12-09 RX ADMIN — HYDROCODONE BITARTRATE AND ACETAMINOPHEN PRN TAB: 5; 325 TABLET ORAL at 09:33

## 2017-12-09 RX ADMIN — CHOLECALCIFEROL CAP 125 MCG (5000 UNIT) SCH UNITS: 125 CAP at 09:33

## 2017-12-09 RX ADMIN — METOPROLOL TARTRATE SCH MG: 25 TABLET, FILM COATED ORAL at 09:33

## 2017-12-09 RX ADMIN — Medication SCH ML: at 09:00

## 2017-12-09 RX ADMIN — VITAMIN D, TAB 1000IU (100/BT) SCH UNITS: 25 TAB at 09:33

## 2017-12-09 RX ADMIN — CALCIUM CARBONATE-CHOLECALCIFEROL TAB 250 MG-125 UNIT SCH MG: 250-125 TAB at 09:33

## 2017-12-09 NOTE — HHI.PR
Subjective


Remarks


She is seen this morning at 8 AM.  Says he's feeling well.  Denies any chest 

pain or shortness of breath.  Reports pain is controlled.





Objective





Vital Signs








  Date Time  Temp Pulse Resp B/P (MAP) Pulse Ox O2 Delivery O2 Flow Rate FiO2


 


12/9/17 07:57 98.7 111 18 123/64 (83) 98   


 


12/9/17 00:00 98.9 87 18 104/60 (75) 97   


 


12/8/17 20:00 97.7 88 16 123/63 (83) 97   


 


12/8/17 17:54     96   


 


12/8/17 15:20 98.5 79 18 116/59 (78) 99   


 


12/8/17 14:24     96   


 


12/8/17 11:54 97.9 86 18 122/62 (82) 95   














I/O      


 


 12/8/17 12/8/17 12/8/17 12/9/17 12/9/17 12/9/17





 07:00 15:00 23:00 07:00 15:00 23:00


 


Intake Total 466 ml 660 ml 480 ml 120 ml  


 


Balance 466 ml 660 ml 480 ml 120 ml  


 


      


 


Intake Oral 360 ml 660 ml 480 ml 120 ml  


 


IV Total 106 ml     


 


# Voids  2 3 2  


 


# Bowel Movements  0    








Result Diagram:  


12/8/17 0635                                                                   

             12/7/17 0654





Objective Remarks


GENERAL: Patient lying in bed.  Appears comfortable.


SKIN: Warm and dry.


HEAD: Normocephalic.


EYES: No scleral icterus. No injection or drainage. 


NECK: Supple, trachea midline. No JVD.


CARDIOVASCULAR: Regular rate and rhythm without murmurs, gallops, or rubs. 


RESPIRATORY: Breath sounds equal bilaterally. No accessory muscle use.


GASTROINTESTINAL: Abdomen soft, non-tender, nondistended. 


MUSCULOSKELETAL: No cyanosis, or edema.  Peripheral perfusion intact.


BACK: Nontender without obvious deformity. No CVA tenderness.








A/P


Assessment and Plan





//Hip fracture, left


status post left hemiarthroplasty by orthopedic surgery.


Pain control as per orthopedic surgery recommendations.  Continue Norco and 

morphine sulfate IV as needed for pain.


As per orthopedic surgery weightbearing as tolerated with posterior hip 

precautions.





//Confused


//Suspected chronic dementia.


Follows commands.


Continue donepezil which the patient takes at home.





// A-fib


=The patient previously on Xarelto however likely the patient is not a good 

candidate for anticoagulation at this time due to high risk of falls.


= Patient does have history of stroke as seen on head CT.  Recommend follow-up 

with cardiology to consider continuation of anticoagulation.


Continue metoprolol tartrate 25 minutes by mouth twice a day.





Assessment and Plan


DVT prophylaxis: As per orthopedic surgery recommendations.


Discharge Planning


Discharge to SNF today.


Follow-up with orthopedics as outpatient


Recommend follow-up with cardiology to address continued anticoagulation given 

history of ARaghavendra Sam MD Dec 9, 2017 08:40

## 2017-12-09 NOTE — HHI.DS
__________________________________________________





Discharge Summary


Admission Date


Dec 6, 2017 at 20:44


Discharge Date:  Dec 9, 2017


Admitting Diagnosis





hip fracture, confusion, possible dementia





(1) Hip fracture, left


ICD Code:  S72.002A - Fracture of unspecified part of neck of left femur, 

initial encounter for closed fracture


Status:  Acute


(2) Confused


ICD Code:  R41.0 - Disorientation, unspecified


Status:  Acute


(3) A-fib


ICD Code:  I48.91 - Unspecified atrial fibrillation


Status:  Acute


Procedures


Left hip hemiarthroplasty


Brief History - From Admission


left hip top of hip pain 


said he fell about a month ago


denies passing out 


denies any other symptoms apart from pain


CBC/BMP:  


12/8/17 0635                                                                   

             12/7/17 0654





Significant Findings





Laboratory Tests








Test


  12/6/17


20:00 12/7/17


06:54 12/8/17


06:35


 


Red Blood Count


  3.49 MIL/MM3


(4.50-5.90) 3.14 MIL/MM3


(4.50-5.90) 


 


 


Hemoglobin


  10.8 GM/DL


(13.0-17.0) 10.1 GM/DL


(13.0-17.0) 10.2 GM/DL


(13.0-17.0)


 


Hematocrit


  32.5 %


(39.0-51.0) 29.1 %


(39.0-51.0) 29.6 %


(39.0-51.0)


 


Mean Platelet Volume


  6.8 FL


(7.0-11.0) 


  


 


 


Monocytes (%) (Auto)


  10.6 %


(0.0-8.0) 8.9 %


(0.0-8.0) 


 


 


Eosinophils (%) (Auto)


  5.2 %


(0.0-4.0) 6.6 %


(0.0-4.0) 


 


 


Basophils (%) (Auto)


  2.3 %


(0.0-2.0) 


  


 


 


Estimat Glomerular Filtration


Rate 74 ML/MIN


(>89) 


  


 


 


Calcium Level


  


  8.4 MG/DL


(8.5-10.1) 


 


 


Chloride Level


  


  109 MEQ/L


() 


 








Imaging





Last Impressions








Hip and Pelvis X-Ray 12/7/17 1058 Signed





Impressions: 





 Service Date/Time:  Thursday, December 7, 2017 12:56 - CONCLUSION: 

Postsurgical 





 changes as above.   Fco Cotto MD 


 


Chest X-Ray 12/6/17 1910 Signed





Impressions: 





 Service Date/Time:  Wednesday, December 6, 2017 19:32 - CONCLUSION:  1. 





 Cardiomegaly.  2. No focal infiltrate or pulmonary vascular congestion.     





 Nigel Siddiqui MD 


 


Head CT 12/6/17 0000 Signed





Impressions: 





 Service Date/Time:  Wednesday, December 6, 2017 20:06 - CONCLUSION:  1. 

Diffuse 





 cerebral atrophy.  2. Moderate periventricular and subcortical white matter 





 small vessel ischemic changes bilaterally.  3. 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 





 frontal infarcts.  4. No acute infarct, acute hemorrhage, mass effect or extra 





 axial fluid collections.     Nigel Siddiqui MD 








PE at Discharge


Awake and alert oriented to person


Clear lungs BL


Abdomen soft, nt, nd


S1S2 irregularly irregular, no MRG


left lower extremity on a cast. Pedal pulses present BL


Hospital Course














X-rays with left hip fracture as above.  Patient underwent left 

hemiarthroplasty by orthopedic surgery.  Uncomplicated postoperative course.  

Patient was discharged home with a course of anticoagulation as per 

orthopedics.  Follow-up with orthopedics as outpatient.  Patient remained 

confused during admission, likely secondary to chronic dementia.  Continued on 

dementia meds.  Patient does have history of atrial fibrillation, with what 

appears to be prior strokes on CT brain.  Recommend follow-up with cardiology 

as outpatient to address the possible need for ongoing anticoagulation.





for Problem-based summary from most recent progress note, please see below.





Assessment and Plan





//Hip fracture, left


status post left hemiarthroplasty by orthopedic surgery.


Pain control as per orthopedic surgery recommendations.  Continue Norco and 

morphine sulfate IV as needed for pain.


As per orthopedic surgery weightbearing as tolerated with posterior hip 

precautions.





//Confused


//Suspected chronic dementia.


Follows commands.


Continue donepezil which the patient takes at home.





// A-fib


=The patient previously on Xarelto however likely the patient is not a good 

candidate for anticoagulation at this time due to high risk of falls.


= Patient does have history of stroke as seen on head CT.  Recommend follow-up 

with cardiology to consider continuation of anticoagulation.


Continue metoprolol tartrate 25 minutes by mouth twice a day.





Assessment and Plan


DVT prophylaxis: As per orthopedic surgery recommendations.


Discharge Planning


Discharge to SNF today.


Follow-up with orthopedics as outpatient


Recommend follow-up with cardiology to address continued anticoagulation given 

history of A. fib.


Pt Condition on Discharge:  Good


Discharge Disposition:  Discharge to SNF


Discharge Time:  > 30 minutes


Discharge Instructions


DIET: Follow Instructions for:  Diabetic Diet


Activities you can perform:  Weight Bearing as Michael


Activities to Avoid:  Shower


Follow up Referrals:  


Cardiology - 1 Week


Orthopedics - 2 Weeks @ [facility] with Oz Zimmerman MD





New Medications:  


Calcium Carbonate-Vitamin D (Calcium 600+D 200) 600-200 Mg-Unit Tab


1 TAB PO BID for Nutritional Supplement for 30 Days, #60 TAB 0 Refills





Cholecalciferol (Vitamin D3) 2,000 Unit Cap


2000 UNITS PO DAILY for Nutritional Supplement, #56 CAP 0 Refills





Ergocalciferol (Ergocalciferol) 50,000 Unit Cap


24534 UNITS PO Q7D for Nutritional Supplement, #56 CAP





Hydrocodone-Acetaminophen (Hydrocodone-Acetaminophen) 7.5 Mg-325 Mg Tab


1 TAB PO Q4H PRN for PAIN, #60 TAB 0 Refills





Rivaroxaban (Xarelto) 10 Mg Tab


10 MG PO DAILY for Blood Clot Prevention, #14 TAB 0 Refills





Walker/Adult/Folding (Walker/Adult/Folding) 1 Mis Mis


EA .ROUTE AS DIRECTED, #1 0 Refills





 


Continued Medications:  


Donepezil (Donepezil) 10 Mg Tab


10 MG PO HS for Dementia, #30 TAB 0 Refills





Metoprolol Tartrate (Metoprolol Tartrate) 25 Mg Tab


25 MG PO BID, #60 TAB 0 Refills





Mirtazapine (Remeron) 15 Mg Tab


15 MG PO HS for Depression Control, #30 TAB 0 Refills





Sennosides (Sennosides) 8.6 Mg Tab


8.6 MG PO HS for Constipation, TAB 0 Refills

















Raghavendra Young MD Dec 9, 2017 08:45

## 2018-01-13 ENCOUNTER — HOSPITAL ENCOUNTER (INPATIENT)
Dept: HOSPITAL 17 - NEPE | Age: 83
LOS: 3 days | DRG: 871 | End: 2018-01-16
Attending: INTERNAL MEDICINE | Admitting: INTERNAL MEDICINE
Payer: SELF-PAY

## 2018-01-13 VITALS — SYSTOLIC BLOOD PRESSURE: 113 MMHG | HEART RATE: 140 BPM | DIASTOLIC BLOOD PRESSURE: 75 MMHG

## 2018-01-13 VITALS
RESPIRATION RATE: 30 BRPM | HEART RATE: 115 BPM | SYSTOLIC BLOOD PRESSURE: 104 MMHG | OXYGEN SATURATION: 100 % | DIASTOLIC BLOOD PRESSURE: 53 MMHG

## 2018-01-13 VITALS
HEART RATE: 130 BPM | RESPIRATION RATE: 14 BRPM | OXYGEN SATURATION: 97 % | DIASTOLIC BLOOD PRESSURE: 75 MMHG | SYSTOLIC BLOOD PRESSURE: 110 MMHG

## 2018-01-13 VITALS — TEMPERATURE: 100.3 F | DIASTOLIC BLOOD PRESSURE: 60 MMHG | SYSTOLIC BLOOD PRESSURE: 96 MMHG

## 2018-01-13 VITALS
SYSTOLIC BLOOD PRESSURE: 118 MMHG | HEART RATE: 137 BPM | OXYGEN SATURATION: 100 % | DIASTOLIC BLOOD PRESSURE: 63 MMHG | RESPIRATION RATE: 25 BRPM

## 2018-01-13 VITALS — OXYGEN SATURATION: 100 % | RESPIRATION RATE: 20 BRPM | HEART RATE: 151 BPM

## 2018-01-13 VITALS
OXYGEN SATURATION: 99 % | DIASTOLIC BLOOD PRESSURE: 62 MMHG | SYSTOLIC BLOOD PRESSURE: 100 MMHG | HEART RATE: 141 BPM | RESPIRATION RATE: 18 BRPM

## 2018-01-13 VITALS — DIASTOLIC BLOOD PRESSURE: 71 MMHG | SYSTOLIC BLOOD PRESSURE: 129 MMHG

## 2018-01-13 VITALS
TEMPERATURE: 98 F | HEART RATE: 132 BPM | RESPIRATION RATE: 38 BRPM | OXYGEN SATURATION: 93 % | SYSTOLIC BLOOD PRESSURE: 109 MMHG | DIASTOLIC BLOOD PRESSURE: 56 MMHG

## 2018-01-13 VITALS
DIASTOLIC BLOOD PRESSURE: 72 MMHG | RESPIRATION RATE: 20 BRPM | TEMPERATURE: 98.1 F | SYSTOLIC BLOOD PRESSURE: 98 MMHG | OXYGEN SATURATION: 97 % | HEART RATE: 82 BPM

## 2018-01-13 VITALS — OXYGEN SATURATION: 94 %

## 2018-01-13 VITALS — WEIGHT: 141.1 LBS | HEIGHT: 67 IN | BODY MASS INDEX: 22.15 KG/M2

## 2018-01-13 VITALS — HEART RATE: 134 BPM

## 2018-01-13 VITALS — HEART RATE: 105 BPM

## 2018-01-13 VITALS — OXYGEN SATURATION: 96 %

## 2018-01-13 VITALS — HEART RATE: 88 BPM

## 2018-01-13 DIAGNOSIS — R06.03: ICD-10-CM

## 2018-01-13 DIAGNOSIS — D69.6: ICD-10-CM

## 2018-01-13 DIAGNOSIS — G30.9: ICD-10-CM
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
## 2018-01-13 DIAGNOSIS — E88.09: ICD-10-CM

## 2018-01-13 DIAGNOSIS — R13.10: ICD-10-CM

## 2018-01-13 DIAGNOSIS — K72.00: ICD-10-CM

## 2018-01-13 DIAGNOSIS — N17.9: ICD-10-CM

## 2018-01-13 DIAGNOSIS — M81.0: ICD-10-CM

## 2018-01-13 DIAGNOSIS — M62.82: ICD-10-CM

## 2018-01-13 DIAGNOSIS — I11.0: ICD-10-CM

## 2018-01-13 DIAGNOSIS — Z95.828: ICD-10-CM

## 2018-01-13 DIAGNOSIS — Z51.5: ICD-10-CM

## 2018-01-13 DIAGNOSIS — Z79.01: ICD-10-CM

## 2018-01-13 DIAGNOSIS — I48.2: ICD-10-CM

## 2018-01-13 DIAGNOSIS — L89.621: ICD-10-CM

## 2018-01-13 DIAGNOSIS — I50.9: ICD-10-CM

## 2018-01-13 DIAGNOSIS — F32.9: ICD-10-CM

## 2018-01-13 DIAGNOSIS — J18.9: ICD-10-CM

## 2018-01-13 DIAGNOSIS — D64.9: ICD-10-CM

## 2018-01-13 DIAGNOSIS — L89.611: ICD-10-CM

## 2018-01-13 DIAGNOSIS — K56.41: ICD-10-CM

## 2018-01-13 DIAGNOSIS — A41.9: Primary | ICD-10-CM

## 2018-01-13 DIAGNOSIS — L89.151: ICD-10-CM

## 2018-01-13 DIAGNOSIS — E87.5: ICD-10-CM

## 2018-01-13 DIAGNOSIS — R65.20: ICD-10-CM

## 2018-01-13 DIAGNOSIS — Z66: ICD-10-CM

## 2018-01-13 DIAGNOSIS — I42.9: ICD-10-CM

## 2018-01-13 DIAGNOSIS — Z74.01: ICD-10-CM

## 2018-01-13 DIAGNOSIS — K21.9: ICD-10-CM

## 2018-01-13 DIAGNOSIS — Z95.1: ICD-10-CM

## 2018-01-13 DIAGNOSIS — E86.0: ICD-10-CM

## 2018-01-13 DIAGNOSIS — M19.90: ICD-10-CM

## 2018-01-13 DIAGNOSIS — E87.2: ICD-10-CM

## 2018-01-13 DIAGNOSIS — E87.0: ICD-10-CM

## 2018-01-13 DIAGNOSIS — G93.40: ICD-10-CM

## 2018-01-13 DIAGNOSIS — F02.80: ICD-10-CM

## 2018-01-13 LAB
ALBUMIN SERPL-MCNC: 2.2 GM/DL (ref 3.4–5)
ALP SERPL-CCNC: 119 U/L (ref 45–117)
ALT SERPL-CCNC: 97 U/L (ref 12–78)
AMORPHOUS SEDIMENT, URINE: (no result)
AST SERPL-CCNC: 306 U/L (ref 15–37)
BACTERIA #/AREA URNS HPF: (no result) /HPF
BASOPHILS # BLD AUTO: 0 TH/MM3 (ref 0–0.2)
BASOPHILS NFR BLD: 0.2 % (ref 0–2)
BILIRUB SERPL-MCNC: 1.3 MG/DL (ref 0.2–1)
BUN SERPL-MCNC: 91 MG/DL (ref 7–18)
CALCIUM SERPL-MCNC: 9.4 MG/DL (ref 8.5–10.1)
CHLORIDE SERPL-SCNC: 110 MEQ/L (ref 98–107)
COLOR UR: (no result)
CREAT SERPL-MCNC: 3.6 MG/DL (ref 0.6–1.3)
EOSINOPHIL # BLD: 0 TH/MM3 (ref 0–0.4)
EOSINOPHIL NFR BLD: 0.1 % (ref 0–4)
ERYTHROCYTE [DISTWIDTH] IN BLOOD BY AUTOMATED COUNT: 17 % (ref 11.6–17.2)
GFR SERPLBLD BASED ON 1.73 SQ M-ARVRAT: 16 ML/MIN (ref 89–?)
GLUCOSE SERPL-MCNC: 112 MG/DL (ref 74–106)
GLUCOSE UR STRIP-MCNC: (no result) MG/DL
HCO3 BLD-SCNC: 19.1 MEQ/L (ref 21–32)
HCT VFR BLD CALC: 30.6 % (ref 39–51)
HGB BLD-MCNC: 9.5 GM/DL (ref 13–17)
HGB UR QL STRIP: (no result)
HYALINE CASTS #/AREA URNS LPF: 1 /LPF
INR PPP: 1.5 RATIO
KETONES UR STRIP-MCNC: (no result) MG/DL
LACTIC ACID SEPSIS PROTOCOL: 9 MMOL/L (ref 0.4–2)
LYMPHOCYTES # BLD AUTO: 0.9 TH/MM3 (ref 1–4.8)
LYMPHOCYTES NFR BLD AUTO: 4.4 % (ref 9–44)
MAGNESIUM SERPL-MCNC: 2.1 MG/DL (ref 1.5–2.5)
MCH RBC QN AUTO: 28.4 PG (ref 27–34)
MCHC RBC AUTO-ENTMCNC: 31.1 % (ref 32–36)
MCV RBC AUTO: 91.2 FL (ref 80–100)
MONOCYTE #: 0.8 TH/MM3 (ref 0–0.9)
MONOCYTES NFR BLD: 3.6 % (ref 0–8)
NEUTROPHILS # BLD AUTO: 20 TH/MM3 (ref 1.8–7.7)
NEUTROPHILS NFR BLD AUTO: 91.7 % (ref 16–70)
NITRITE UR QL STRIP: (no result)
PLATELET # BLD: 190 TH/MM3 (ref 150–450)
PMV BLD AUTO: 8.6 FL (ref 7–11)
PROT SERPL-MCNC: 6.6 GM/DL (ref 6.4–8.2)
PROTHROMBIN TIME: 15.5 SEC (ref 9.8–11.6)
RBC # BLD AUTO: 3.36 MIL/MM3 (ref 4.5–5.9)
SODIUM SERPL-SCNC: 147 MEQ/L (ref 136–145)
SP GR UR STRIP: 1.02 (ref 1–1.03)
TROPONIN I SERPL-MCNC: 0.16 NG/ML (ref 0.02–0.05)
TROPONIN I SERPL-MCNC: 0.22 NG/ML (ref 0.02–0.05)
URINE LEUKOCYTE ESTERASE: (no result)
WBC # BLD AUTO: 21.8 TH/MM3 (ref 4–11)

## 2018-01-13 PROCEDURE — 84484 ASSAY OF TROPONIN QUANT: CPT

## 2018-01-13 PROCEDURE — 81001 URINALYSIS AUTO W/SCOPE: CPT

## 2018-01-13 PROCEDURE — 85730 THROMBOPLASTIN TIME PARTIAL: CPT

## 2018-01-13 PROCEDURE — 96368 THER/DIAG CONCURRENT INF: CPT

## 2018-01-13 PROCEDURE — 87804 INFLUENZA ASSAY W/OPTIC: CPT

## 2018-01-13 PROCEDURE — 94664 DEMO&/EVAL PT USE INHALER: CPT

## 2018-01-13 PROCEDURE — 87086 URINE CULTURE/COLONY COUNT: CPT

## 2018-01-13 PROCEDURE — 93306 TTE W/DOPPLER COMPLETE: CPT

## 2018-01-13 PROCEDURE — 87205 SMEAR GRAM STAIN: CPT

## 2018-01-13 PROCEDURE — 84443 ASSAY THYROID STIM HORMONE: CPT

## 2018-01-13 PROCEDURE — 85027 COMPLETE CBC AUTOMATED: CPT

## 2018-01-13 PROCEDURE — 76937 US GUIDE VASCULAR ACCESS: CPT

## 2018-01-13 PROCEDURE — 84300 ASSAY OF URINE SODIUM: CPT

## 2018-01-13 PROCEDURE — 96365 THER/PROPH/DIAG IV INF INIT: CPT

## 2018-01-13 PROCEDURE — 80061 LIPID PANEL: CPT

## 2018-01-13 PROCEDURE — 36600 WITHDRAWAL OF ARTERIAL BLOOD: CPT

## 2018-01-13 PROCEDURE — 83605 ASSAY OF LACTIC ACID: CPT

## 2018-01-13 PROCEDURE — 82570 ASSAY OF URINE CREATININE: CPT

## 2018-01-13 PROCEDURE — 94640 AIRWAY INHALATION TREATMENT: CPT

## 2018-01-13 PROCEDURE — 80074 ACUTE HEPATITIS PANEL: CPT

## 2018-01-13 PROCEDURE — 82550 ASSAY OF CK (CPK): CPT

## 2018-01-13 PROCEDURE — 82552 ASSAY OF CPK IN BLOOD: CPT

## 2018-01-13 PROCEDURE — 82948 REAGENT STRIP/BLOOD GLUCOSE: CPT

## 2018-01-13 PROCEDURE — 83615 LACTATE (LD) (LDH) ENZYME: CPT

## 2018-01-13 PROCEDURE — 71045 X-RAY EXAM CHEST 1 VIEW: CPT

## 2018-01-13 PROCEDURE — 74176 CT ABD & PELVIS W/O CONTRAST: CPT

## 2018-01-13 PROCEDURE — 85007 BL SMEAR W/DIFF WBC COUNT: CPT

## 2018-01-13 PROCEDURE — 82140 ASSAY OF AMMONIA: CPT

## 2018-01-13 PROCEDURE — 74018 RADEX ABDOMEN 1 VIEW: CPT

## 2018-01-13 PROCEDURE — 85025 COMPLETE CBC W/AUTO DIFF WBC: CPT

## 2018-01-13 PROCEDURE — 93005 ELECTROCARDIOGRAM TRACING: CPT

## 2018-01-13 PROCEDURE — 84132 ASSAY OF SERUM POTASSIUM: CPT

## 2018-01-13 PROCEDURE — 76705 ECHO EXAM OF ABDOMEN: CPT

## 2018-01-13 PROCEDURE — 70450 CT HEAD/BRAIN W/O DYE: CPT

## 2018-01-13 PROCEDURE — 80202 ASSAY OF VANCOMYCIN: CPT

## 2018-01-13 PROCEDURE — P9045 ALBUMIN (HUMAN), 5%, 250 ML: HCPCS

## 2018-01-13 PROCEDURE — 51702 INSERT TEMP BLADDER CATH: CPT

## 2018-01-13 PROCEDURE — 84100 ASSAY OF PHOSPHORUS: CPT

## 2018-01-13 PROCEDURE — 80053 COMPREHEN METABOLIC PANEL: CPT

## 2018-01-13 PROCEDURE — 83735 ASSAY OF MAGNESIUM: CPT

## 2018-01-13 PROCEDURE — 85384 FIBRINOGEN ACTIVITY: CPT

## 2018-01-13 PROCEDURE — 85610 PROTHROMBIN TIME: CPT

## 2018-01-13 PROCEDURE — 87040 BLOOD CULTURE FOR BACTERIA: CPT

## 2018-01-13 PROCEDURE — 82805 BLOOD GASES W/O2 SATURATION: CPT

## 2018-01-13 RX ADMIN — Medication SCH ML: at 21:02

## 2018-01-13 RX ADMIN — LEVOFLOXACIN SCH MLS/HR: 5 INJECTION, SOLUTION INTRAVENOUS at 17:29

## 2018-01-13 RX ADMIN — HEPARIN SODIUM SCH UNITS: 10000 INJECTION, SOLUTION INTRAVENOUS; SUBCUTANEOUS at 15:50

## 2018-01-13 RX ADMIN — THIAMINE HYDROCHLORIDE SCH MLS/HR: 100 INJECTION, SOLUTION INTRAMUSCULAR; INTRAVENOUS at 15:49

## 2018-01-13 RX ADMIN — WATER SCH ML: 1 IRRIGANT IRRIGATION at 17:28

## 2018-01-13 RX ADMIN — METOPROLOL TARTRATE SCH MG: 25 TABLET, FILM COATED ORAL at 21:01

## 2018-01-13 RX ADMIN — STANDARDIZED SENNA CONCENTRATE AND DOCUSATE SODIUM SCH TAB: 8.6; 5 TABLET, FILM COATED ORAL at 21:00

## 2018-01-13 RX ADMIN — POLYETHYLENE GLYCOL 3350 SCH GM: 17 POWDER, FOR SOLUTION ORAL at 21:00

## 2018-01-13 RX ADMIN — WATER SCH ML: 1 IRRIGANT IRRIGATION at 21:00

## 2018-01-13 RX ADMIN — INSULIN ASPART SCH: 100 INJECTION, SOLUTION INTRAVENOUS; SUBCUTANEOUS at 21:00

## 2018-01-13 RX ADMIN — MIRTAZAPINE SCH MG: 15 TABLET, FILM COATED ORAL at 21:01

## 2018-01-13 RX ADMIN — THIAMINE HYDROCHLORIDE SCH MLS/HR: 100 INJECTION, SOLUTION INTRAMUSCULAR; INTRAVENOUS at 22:35

## 2018-01-13 RX ADMIN — DONEPEZIL HYDROCHLORIDE SCH MG: 5 TABLET, FILM COATED ORAL at 21:01

## 2018-01-13 RX ADMIN — IPRATROPIUM BROMIDE AND ALBUTEROL SULFATE SCH AMPULE: .5; 3 SOLUTION RESPIRATORY (INHALATION) at 22:37

## 2018-01-13 RX ADMIN — SODIUM CHLORIDE PRN MLS/HR: 900 INJECTION, SOLUTION INTRAVENOUS at 17:28

## 2018-01-13 RX ADMIN — IPRATROPIUM BROMIDE AND ALBUTEROL SULFATE SCH AMPULE: .5; 3 SOLUTION RESPIRATORY (INHALATION) at 15:16

## 2018-01-13 RX ADMIN — AZTREONAM SCH MLS/HR: 1 INJECTION, POWDER, LYOPHILIZED, FOR SOLUTION INTRAMUSCULAR; INTRAVENOUS at 18:44

## 2018-01-13 NOTE — RADRPT
EXAM DATE/TIME:  01/13/2018 10:48 

 

[facility] COMPARISON:     

CHEST SINGLE AP, December 06, 2017, 19:32.

 

                     

INDICATIONS :     

Fever

                     

 

MEDICAL HISTORY :     

None.          

 

SURGICAL HISTORY :     

CABG.   

 

ENCOUNTER:     

Initial                                        

 

ACUITY:     

1 day      

 

PAIN SCORE:     

Non-responsive.

 

LOCATION:     

Bilateral chest 

 

FINDINGS:     

Single AP view of the chest. Median sternotomy wires are present. Mild bilateral mid to lower lung zo
ne opacity and mild pulmonary vasculature indistinctness. No evidence of pleural effusion or pneumoth
orax. Cardiomediastinal silhouette unchanged.

 

CONCLUSION:     

Mild bilateral interstitial lower lung opacity. Differential diagnosis includes mild pulmonary edema 
atelectasis, and infection.

 

 

 

 Rod Tyson MD on January 13, 2018 at 11:22           

Board Certified Radiologist.

 This report was verified electronically.

## 2018-01-13 NOTE — RADRPT
EXAM DATE/TIME:  01/13/2018 11:31 

 

[facility] COMPARISON:     

CT BRAIN W/O CONTRAST, December 06, 2017, 20:06.

 

 

INDICATIONS :     

Altered mental status.

                      

 

RADIATION DOSE:     

36.08 CTDIvol (mGy) 

 

 

 

MEDICAL HISTORY :     

Dementia. Hypertension. Cardiovascular disease

 

SURGICAL HISTORY :      

None. 

 

ENCOUNTER:      

Initial

 

ACUITY:      

1 day

 

PAIN SCALE:      

10/10

 

LOCATION:       

Bilateral  head

 

TECHNIQUE:     

Multiple contiguous axial images were obtained of the head.  Using automated exposure control and adj
ustment of the mA and/or kV according to patient size, radiation dose was kept as low as reasonably a
chievable to obtain optimal diagnostic quality images.   DICOM format image data is available electro
nically for review and comparison.  

 

FINDINGS:     

 

CEREBRUM:     

Diffuse prominence of the ventricles, sulci, and cisterns indicating diffuse atrophy. Right frontal l
obe and right parietal lobe encephalomalacia. No evidence of acute intracranial hemorrhage or extra-a
xial fluid collection. No evidence of acute infarct. No intracranial mass lesion.

 

POSTERIOR FOSSA:     

The cerebellum and brainstem are intact.  The 4th ventricle is midline.  The cerebellopontine angle i
s unremarkable.

 

EXTRACRANIAL:     

The visualized portion of the orbits is intact.

 

SKULL:     

The calvaria is intact.  No evidence of skull fracture.

 

CONCLUSION:     

No acute intracranial findings. Chronic ischemic findings.

 

 

 

 Rod Tyson MD on January 13, 2018 at 11:53           

Board Certified Radiologist.

 This report was verified electronically.

## 2018-01-13 NOTE — HHI.HP
[facility]


Service


Critical Care Medicine


Primary Care Physician


Tyson Maxwell M.D.


Admission Diagnosis





severe sepsis/A. fib with RVR


Diagnosis:  


(1) Hypoalbuminemia


Diagnosis:  Secondary





(2) Elevated CPK


Diagnosis:  Secondary





(3) Elevated troponin


Diagnosis:  Secondary





(4) Anemia


Diagnosis:  Principal





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


Diagnosis:  Secondary





(6) Sacral decubitus ulcer


Diagnosis:  Principal





(7) Heel ulcer


Diagnosis:  Principal





(8) Alzheimer's dementia


Diagnosis:  Principal





(9) Depression


Diagnosis:  Principal





(10) Elevated partial thromboplastin time (PTT)


Diagnosis:  Principal





(11) Elevated INR


Diagnosis:  Principal





(12) Acute hypernatremia


Diagnosis:  Principal





(13) Gastroesophageal reflux disease


Diagnosis:  Principal





(14) Chronic anticoagulation


Diagnosis:  Secondary





(15) Acute kidney injury


Diagnosis:  Principal





(16) Lactic acidosis


Diagnosis:  Principal





(17) Elevated levels of transaminase & lactic acid dehydrogenase


Diagnosis:  Principal





(18) Constipation


Diagnosis:  Principal





(19) Severe sepsis


Diagnosis:  Principal





(20) Atrial fibrillation with RVR


Diagnosis:  Principal





(21) Healthcare-associated pneumonia


Diagnosis:  Principal





Chief Complaint:  


Altered mental status


Travel History


International Travel<30 Days:  No


Contact w/Intl Traveler <30 Da:  No


Traveled to Known Affected Are:  No





Sepsis Criteria


SIRS Criteria (2 or more):  Heart rate over 90, WBC > 26467, < 4000 or > 10% 

bands


Sepsis Criteria (SIRS+source):  Infect source susp/known


Severe Sepsis (+one):  Organ Dysfunction, Lactate >2


Septic Shock Criteria:  Lactic acid >=4


Multiple Organ Dysfunction Syn:  Evidence -2 organs failing


Criteria Outcome:  Meets multiple organ dys. criteria


History of Present Illness


89-year-old  male.  Date of admission 2018.  Past medical history 

includes dementia disorder, chronic atrial fibrillation, hypertension, chronic 

anticoagulation, gastroesophageal reflux disease, constipation.  Patient is a 

resident of [facility].  Patient presents to [facility] after 

subacute onset of fevers, altered mental status.  Patient was transported per 

nursing home request.





Patient is noted to be in A. fib with RVR heart rate in the 140s.  Pelvic 

revealed a creatinine of 3.6.  Potassium of 5.8.  White blood cell count 21,

000.  Elevated INR and PTT, lactic acidosis of 9.0, elevated transaminases, 

elevated CPK and troponin and low albumin.  CT abdomen/pelvis revealed fecal 

impaction, nonobstructive bowel gas pattern in an old aortic graft stent.  

Chest x-ray revealed bilateral lower lobe pneumonia.  Patient received cefepime 

and vancomycin in the emergency department.  We are asked to admit the patient.

  He is currently awake and pleasantly confused on 3 L nasal cannula





Review of Systems


ROS Limitations:  Altered Mental Status





Past Family Social History


Allergies:  


Coded Allergies:  


     Penicillins (Verified  Allergy, Severe, 18)


Past Medical History


Dementia disorder NOS


Atrial fibrillation currently in RVR


Hypertension


Osteoarthritis


Gastroesophageal reflux disease


Sacral decubitus ulcer


Osteoporosis


Past Surgical History


Left hip hemiarthroplasty


Aortic stent graft


Reported Medications


Active


Calcium 600+D 200 (Calcium Carbonate-Vitamin D) 600-200 Mg-Unit Tab 1 Tab PO 

BID 30 Days


Vitamin D3 (Cholecalciferol) 2,000 Unit Cap 2,000 Units PO DAILY


Xarelto (Rivaroxaban) 10 Mg Tab 10 Mg PO DAILY


Hydrocodone-Acetaminophen 7.5 Mg-325 Mg Tab 1 Tab PO Q4H PRN


Walker/Adult/Folding (Device) 1 Mis Mis Ea .ROUTE AS DIRECTED


Reported


Zofran (Ondansetron HCl) 4 Mg Tab 4 Mg PO Q12HR PRN


Ergocalciferol 50,000 Unit Cap 50,000 Units PO Q7D


Imodium A-D (Loperamide HCl) 2 Mg Capsule 2 Mg PO AS DIRECTED PRN


     One capsule after each loose stool.


     Not to exceed 8 tablets per day.


Ferrous Sulfate 325 Mg (65 Mg Iron) Tablet 325 Mg PO DAILY


Donepezil 10 Mg Tab 10 Mg PO HS


Remeron (Mirtazapine) 15 Mg Tab 15 Mg PO HS


Sennosides 8.6 Mg Tab 8.6 Mg PO HS


Metoprolol Tartrate 25 Mg Tab 25 Mg PO BID


Active Ordered Medications


Reviewed in EMR


Family History


Patient is demented.  Not the finding past medical records available.


Social History


No documentation tobacco, alcohol or intravenous drug use





Physical Exam


Vital Signs





Vital Signs








  Date Time  Temp Pulse Resp B/P (MAP) Pulse Ox O2 Delivery O2 Flow Rate FiO2


 


18 12:59  140  113/75 (88)    


 


18 11:26     98 Room Air  


 


18 11:01    129/71 (90)    


 


18 10:31 100.3   96/60 (72)    


 


18 10:21  151 20  100   








Physical Exam


GENERAL: 89-year-old  male critically ill currently resting in bed


SKIN:: Dry.  Bilateral stage I heel and stage I sacral decubitus ulcer


HEAD: Atraumatic. Normocephalic. 


EYES: Pupils equal and round about 3 mm bilaterally and reactive. No scleral 

icterus. No injection or drainage. 


ENT: No nasal bleeding or discharge.  Mucous membranes pink and moist.


NECK: Trachea midline. No JVD. 


CARDIOVASCULAR: Tachycardia, IR.  S1, S2 no S4.  Without murmur.  


RESPIRATORY: As crackles appreciated throughout lung fields anteriorly and 

posteriorly. Breath sounds equal bilaterally. 


GASTROINTESTINAL: Abdomen soft, non-tender, nondistended.  Hypoactive bowel 

sounds are appreciated


MUSCULOSKELETAL: Extremities without difficulty and peripheral edema. No 

obvious deformities. 


NEUROLOGICAL: Awake and alert. No obvious cranial nerve deficits.  Moves all 4 

extremities spontaneously.  Follows simple commands.


Laboratory





Laboratory Tests








Test


  18


10:50 18


10:59


 


White Blood Count 21.8  


 


Red Blood Count 3.36  


 


Hemoglobin 9.5  


 


Hematocrit 30.6  


 


Mean Corpuscular Volume 91.2  


 


Mean Corpuscular Hemoglobin 28.4  


 


Mean Corpuscular Hemoglobin


Concent 31.1 


  


 


 


Red Cell Distribution Width 17.0  


 


Platelet Count 190  


 


Mean Platelet Volume 8.6  


 


Neutrophils (%) (Auto) 91.7  


 


Lymphocytes (%) (Auto) 4.4  


 


Monocytes (%) (Auto) 3.6  


 


Eosinophils (%) (Auto) 0.1  


 


Basophils (%) (Auto) 0.2  


 


Neutrophils # (Auto) 20.0  


 


Lymphocytes # (Auto) 0.9  


 


Monocytes # (Auto) 0.8  


 


Eosinophils # (Auto) 0.0  


 


Basophils # (Auto) 0.0  


 


CBC Comment DIFF FINAL  


 


Differential Comment   


 


Prothrombin Time 15.5  


 


Prothromb Time International


Ratio 1.5 


  


 


 


Activated Partial


Thromboplast Time 31.7 


  


 


 


Blood Urea Nitrogen 91  


 


Creatinine 3.60  


 


Random Glucose 112  


 


Total Protein 6.6  


 


Albumin 2.2  


 


Calcium Level 9.4  


 


Magnesium Level 2.1  


 


Alkaline Phosphatase 119  


 


Aspartate Amino Transf


(AST/SGOT) 306 


  


 


 


Alanine Aminotransferase


(ALT/SGPT) 97 


  


 


 


Total Bilirubin 1.3  


 


Sodium Level 147  


 


Potassium Level 5.8  


 


Chloride Level 110  


 


Carbon Dioxide Level 19.1  


 


Anion Gap 18  


 


Estimat Glomerular Filtration


Rate 16 


  


 


 


Lactic Acid Level 9.0  


 


Total Creatine Kinase 620  


 


Creatine Kinase MB 7.6  


 


Creatine Kinase MB % 1.2  


 


Troponin I 0.16  


 


Urine Color  DARK-YELLOW 


 


Urine Turbidity  HAZY 


 


Urine pH  5.0 


 


Urine Specific Gravity  1.020 


 


Urine Protein  TRACE 


 


Urine Glucose (UA)  NEG 


 


Urine Ketones  NEG 


 


Urine Occult Blood  LARGE 


 


Urine Nitrite  NEG 


 


Urine Bilirubin  NEG 


 


Urine Urobilinogen  LESS THAN 2.0 


 


Urine Leukocyte Esterase  NEG 


 


Urine RBC  1 


 


Urine WBC  3 


 


Urine Amorphous Sediment  OCC 


 


Urine Bacteria  RARE 


 


Urine Hyaline Casts  1 


 


Microscopic Urinalysis Comment


  


  CATH-CULTURE


IND














 Date/Time


Source Procedure


Growth Status


 


 


 18 10:50


Blood Peripheral Aerobic Blood Culture


Pending Received


 


 18 10:50


Blood Peripheral Anaerobic Blood Culture


Pending Received


 


 18 10:59


Urine Catheterized Urine Urine Culture


Pending Received








Result Diagram:  


18 1050                                                                   

             18 1050





Imaging





Last Impressions








Abdomen/Pelvis CT 18 1206 Signed





Impressions: 





 Service Date/Time:  2018 12:40 - CONCLUSION:  1. 





 Nonobstructive bowel gas pattern which could represent a mild ileus. There is 

a 





 moderate to large amount of stool in the distal colon which could indicate 





 constipation. 2. Dense consolidation in both lower lobes concerning for 





 pneumonia. 3. The gallbladder is at the upper limits of normal in size. 4. 





 Status post aortic stent graft placement.     Mina Morales MD 


 


Head CT 18 1046 Signed





Impressions: 





 Service Date/Time:  2018 11:31 - CONCLUSION:  No acute 





 intracranial findings. Chronic ischemic findings.     Rod Tyson MD 


 


Chest X-Ray 18 1024 Signed





Impressions: 





 Service Date/Time:  2018 10:48 - CONCLUSION:  Mild 





 bilateral interstitial lower lung opacity. Differential diagnosis includes 

mild 





 pulmonary edema atelectasis, and infection.     Rod Tyson MD 











Septic Shock Reassessment


Septic shock perfusion:  reassessment completed





Caprini VTE Risk Assessment


Caprini VTE Risk Assessment:  Mod/High Risk (score >= 2)


Caprini Risk Assessment Model











 Point Value = 1          Point Value = 2  Point Value = 3  Point Value = 5


 


Age 41-60


Minor surgery


BMI > 25 kg/m2


Swollen legs


Varicose veins


Pregnancy or postpartum


History of unexplained or recurrent


   spontaneous 


Oral contraceptives or hormone


   replacement


Sepsis (< 1 month)


Serious lung disease, including


   pneumonia (< 1 month)


Abnormal pulmonary function


Acute myocardial infarction


Congestive heart failure (< 1 month)


History of inflammatory bowel disease


Medical patient at bed rest Age 61-74


Arthroscopic surgery


Major open surgery (> 45 min)


Laparoscopic surgery (> 45 min)


Malignancy


Confined to bed (> 72 hours)


Immobilizing plaster cast


Central venous access Age >= 75


History of VTE


Family history of VTE


Factor V Leiden


Prothrombin 42519D


Lupus anticoagulant


Anticardiolipin antibodies


Elevated serum homocysteine


Heparin-induced thrombocytopenia


Other congenital or acquired


   thrombophilia Stroke (< 1 month)


Elective arthroplasty


Hip, pelvis, or leg fracture


Acute spinal cord injury (< 1 month)








Prophylaxis Regimen











   Total Risk


Factor Score Risk Level Prophylaxis Regimen


 


0-1      Low Early ambulation


 


2 Moderate Order ONE of the following:


*Sequential Compression Device (SCD)


*Heparin 5000 units SQ BID


 


3-4 Higher Order ONE of the following medications:


*Heparin 5000 units SQ TID


*Enoxaparin/Lovenox 40 mg SQ daily (WT < 150 kg, CrCl > 30 mL/min)


*Enoxaparin/Lovenox 30 mg SQ daily (WT < 150 kg, CrCl > 10-29 mL/min)


*Enoxaparin/Lovenox 30 mg SQ BID (WT < 150 kg, CrCl > 30 mL/min)


AND/OR


*Sequential Compression Device (SCD)


 


5 or more Highest Order ONE of the following medications:


*Heparin 5000 units SQ TID (Preferred with Epidurals)


*Enoxaparin/Lovenox 40 mg SQ daily (WT < 150 kg, CrCl > 30 mL/min)


*Enoxaparin/Lovenox 30 mg SQ daily (WT < 150 kg, CrCl > 10-29 mL/min)


*Enoxaparin/Lovenox 30 mg SQ BID (WT < 150 kg, CrCl > 30 mL/min)


AND


*Sequential Compression Device (SCD)











Assessment and Plan


Assessment and Plan


Neuro/Psych:





Dementia disorder


 


Acetaminophen 650 mg by mouth every 6 hours.  Fever


Hydrocodone/acetaminophen 5/325 one tablet every 4 hours when necessary pain 1-5


Morphine sulfate 2 mg IV every 8.  Pain 6-10


Continued donepezil 10 mg daily for underlying dementia


Continue mirtazapine 15 mg at night for depression


CT brain revealed no acute intracranial findings





CV: 





Severe sepsis with multisystem organ failure


Atrial fibrillation with rapid ventricular response


Lactic acidosis


Elevated troponin


History of endovascular aortic grafting





Currently diltiazem drip titrated to keep heart rate less than 120


On metoprolol 25 mg by mouth twice a day


Serial troponin every 6 hours 2.  Check TSH


Routine 2-D echocardiogram ordered





Resp: 





Acute respiratory insufficiency secondary to health care associated pneumonia





Nasal cannula to maintain saturations greater than equal to 92%


Incentive spirometry while awake


Albuterol/ipratropium aerosols every 6 hours with albuterol aerosols every 2 

hours.  Dyspnea


Follow-up chest x-ray in a.m.





GI: 





Elevated transaminases likely secondary to shock liver


Gastroesophageal reflux disease


Constipation with fecal impaction





CT abdomen/pelvis revealed nonobstructive bowel gas pattern, stool in the rectum

, bilateral lower lobe infiltrates in a endovascular aortic stent graft


Check hepatitis panel 





:  





Cleary catheter for accurate I's and O's in a critically ill patient





Endo:  





Sliding-scale insulin with Accu-Cheks before meals and at bedtime to maintain 

euglycemia Novulog 





Renal: 





Acute kidney injury





Check renal ultrasound and urine electrolytes and eosinophils


Avoid nephrotoxic drugs


Monitor urine output


Accurate I's and O's


Aggressive hydration with crystalloid





Heme:





Leukocytosis


Normocytic anemia


Elevated INR/PTT - likely secondary to early DIC with pending fibrinogen


Chronic Rivaroxaban use 10 mg daily





Monitor CBC daily.  Follow trends


No indication for transfusion of blood products at this time


Continue ferrous sulfate 325 mg by mouth daily





ID:





Received cefepime and vancomycin ED





Day #1 vancomycin, aztreonam, levofloxacin and metronidazole





Blood cultures 2, urine Legionella pneumococcal antigen, influenza and sputum 

all ordered.  UA was negative though culture has been sent.  No nitrates or 

leukocyte esterase





MSK:





Recent left hip hemiarthroplasty





Continue cholecalciferol 2000 units daily


PT evaluate and treat





FEN:





Hypernatremia


Hyperkalemia





Received calcium gluconate, D50, insulin, bicarbonate and Kayexalate.  Recheck 

in 3 hours


Switch to half-normal saline at 125 cc an hour





Access:


- Utilize peripheral IV.  Central line if indicated





Prophylaxis


- GI - pantoprazole


- DVT - SCDs/ holding  Rivaroxaban today.  Likely resume in a.m





Critical Care:


The total critical care time was 35 minutes. Time to perform other separately 

billable procedures was not included in the critical care time.


Code Status


Full code


Discussed Condition With


ED physician.  Care plan discussed and all questions answered.





Problem Qualifiers





(1) Anemia:  


Qualified Codes:  D64.9 - Anemia, unspecified


(2) Sacral decubitus ulcer:  


Qualified Codes:  L89.151 - Pressure ulcer of sacral region, stage 1


(3) Heel ulcer:  


Qualified Codes:  L97.401 - Non-pressure chronic ulcer of unspecified heel and 

midfoot limited to breakdown of skin


(4) Alzheimer's dementia:  


Qualified Codes:  G30.0 - Alzheimer's disease with early onset; F02.80 - 

Dementia in other diseases classified elsewhere without behavioral disturbance


(5) Depression:  


Qualified Codes:  F32.9 - Major depressive disorder, single episode, unspecified


(6) Gastroesophageal reflux disease:  


Qualified Codes:  K21.9 - Gastro-esophageal reflux disease without esophagitis


(7) Constipation:  


Qualified Codes:  K59.00 - Constipation, unspecified








Ted Arnett MD 2018 13:44

## 2018-01-13 NOTE — PD
HPI


Chief Complaint:  Altered Mental Status


Time Seen by Provider:  10:44


Travel History


International Travel<30 days:  No


Contact w/Intl Traveler<30days:  No


Traveled to known affect area:  No





History of Present Illness


HPI


89-year-old male patient with history of dementia, BARRERA reyes, presents to the ER 

from nursing home for altered mental status.  Apparently they had done a workup 

at the facility and found out that he had leukocytosis of uncertain etiology, 

started on Levaquin last night.  He is fairly disoriented and not able to give 

me any further history.  Patient is sent in for altered mental status.





Modifying Factors: None


Associated Signs & Symptoms: Altered mental status, leukocytosis


Risk Factors: Elderly nursing home patient





PFSH


Past Medical History


Atrial Fibrillation:  Yes


Cardiovascular Problems:  Yes


Dementia:  Yes


GERD:  Yes


Hypertension:  Yes





Past Surgical History


Cardiac Surgery:  Yes (FROM )





Social History


Alcohol Use:  No


Tobacco Use:  No


Substance Use:  No





Allergies-Medications


(Allergen,Severity, Reaction):  


Coded Allergies:  


     Penicillins (Verified  Allergy, Severe, 1/13/18)


Reported Meds & Prescriptions





Reported Meds & Active Scripts


Active


Calcium 600+D 200 (Calcium Carbonate-Vitamin D) 600-200 Mg-Unit Tab 1 Tab PO 

BID 30 Days


Vitamin D3 (Cholecalciferol) 2,000 Unit Cap 2,000 Units PO DAILY


Xarelto (Rivaroxaban) 10 Mg Tab 10 Mg PO DAILY


Hydrocodone-Acetaminophen 7.5 Mg-325 Mg Tab 1 Tab PO Q4H PRN


Walker/Adult/Folding (Device) 1 Mis Mis Ea .ROUTE AS DIRECTED


Reported


Zofran (Ondansetron HCl) 4 Mg Tab 4 Mg PO Q12HR PRN


Ergocalciferol 50,000 Unit Cap 50,000 Units PO Q7D


Imodium A-D (Loperamide HCl) 2 Mg Capsule 2 Mg PO AS DIRECTED PRN


     One capsule after each loose stool.


     Not to exceed 8 tablets per day.


Ferrous Sulfate 325 Mg (65 Mg Iron) Tablet 325 Mg PO DAILY


Donepezil 10 Mg Tab 10 Mg PO HS


Remeron (Mirtazapine) 15 Mg Tab 15 Mg PO HS


Sennosides 8.6 Mg Tab 8.6 Mg PO HS


Metoprolol Tartrate 25 Mg Tab 25 Mg PO BID








Review of Systems


ROS Limitations:  Altered Mental Status





Physical Exam


Narrative


GENERAL: Well-developed elderly white male patient currently in moderate 

distress.  Fairly agitated.  Oriented to self.


SKIN: Focused skin assessment warm/dry.


HEAD: Atraumatic. Normocephalic. 


EYES: Pupils equal and round. No scleral icterus. No injection or drainage. 


ENT: No nasal bleeding or discharge.  Mucous membranes pink and moist.


NECK: Trachea midline. No JVD, flat veins. 


CARDIOVASCULAR: Regular rate and rhythm.  No murmur appreciated.


RESPIRATORY: No accessory muscle use. Clear to auscultation. Breath sounds 

equal bilaterally. 


GASTROINTESTINAL: Abdomen soft, non-tender, nondistended. Hepatic and splenic 

margins not palpable. 


MUSCULOSKELETAL: No obvious deformities. No clubbing.  No cyanosis.  No edema. 


NEUROLOGICAL: Awake but not oriented. No obvious cranial nerve deficits.  Motor 

grossly within normal limits. Normal speech.


PSYCHIATRIC: Not oriented; insight and judgment poor.





Data


Data


Last Documented VS





Vital Signs








  Date Time  Temp Pulse Resp B/P (MAP) Pulse Ox O2 Delivery O2 Flow Rate FiO2


 


1/13/18 12:59  140  113/75 (88)    


 


1/13/18 11:26     98 Room Air  


 


1/13/18 10:31 100.3       


 


1/13/18 10:21   20     








Orders





 Orders


Sepsis Workup Initiated (1/13/18 )


Complete Blood Count With Diff (1/13/18 10:24)


Comprehensive Metabolic Panel (1/13/18 10:24)


Prothrombin Time / Inr (Pt) (1/13/18 10:24)


Act Partial Throm Time (Ptt) (1/13/18 10:24)


Lactic Acid Sepsis Protocol (1/13/18 10:24)


Magnesium (Mg) (1/13/18 10:24)


Ckmb (Isoenzyme) Profile (1/13/18 10:24)


Troponin I (1/13/18 10:24)


Urinalysis - C+S If Indicated (1/13/18 10:24)


Blood Culture (1/13/18 10:24)


Chest, Single Ap (1/13/18 10:24)


Blood Glucose (1/13/18 10:24)


Ecg Monitoring (1/13/18 10:24)


Iv Access Insert/Monitor (1/13/18 10:24)


Oximetry (1/13/18 10:24)


Oxygen Administration (1/13/18 10:24)


Sodium Chlor 0.9% 1000 Ml Inj (Ns 1000 M (1/13/18 10:45)


Vancomycin Inj (Vancomycin Inj) (1/13/18 10:44)


Cefepime Inj (Maxipime Inj) (1/13/18 10:44)


Ct Brain W/O Iv Contrast(Rout) (1/13/18 10:46)


Urinary Catheter Insert/Apply (1/13/18 10:47)


Urine Culture (1/13/18 10:59)


CKMB (1/13/18 10:50)


CKMB% (1/13/18 10:50)


Ct Abd/Pel W/O Iv Contrast (1/13/18 12:06)


Electrocardiogram (1/13/18 10:20)


Admit Order (Ed Use Only) (1/13/18 13:05)





Labs





Laboratory Tests








Test


  1/13/18


10:50 1/13/18


10:59


 


White Blood Count 21.8 TH/MM3  


 


Red Blood Count 3.36 MIL/MM3  


 


Hemoglobin 9.5 GM/DL  


 


Hematocrit 30.6 %  


 


Mean Corpuscular Volume 91.2 FL  


 


Mean Corpuscular Hemoglobin 28.4 PG  


 


Mean Corpuscular Hemoglobin


Concent 31.1 % 


  


 


 


Red Cell Distribution Width 17.0 %  


 


Platelet Count 190 TH/MM3  


 


Mean Platelet Volume 8.6 FL  


 


Neutrophils (%) (Auto) 91.7 %  


 


Lymphocytes (%) (Auto) 4.4 %  


 


Monocytes (%) (Auto) 3.6 %  


 


Eosinophils (%) (Auto) 0.1 %  


 


Basophils (%) (Auto) 0.2 %  


 


Neutrophils # (Auto) 20.0 TH/MM3  


 


Lymphocytes # (Auto) 0.9 TH/MM3  


 


Monocytes # (Auto) 0.8 TH/MM3  


 


Eosinophils # (Auto) 0.0 TH/MM3  


 


Basophils # (Auto) 0.0 TH/MM3  


 


CBC Comment DIFF FINAL  


 


Differential Comment   


 


Prothrombin Time 15.5 SEC  


 


Prothromb Time International


Ratio 1.5 RATIO 


  


 


 


Activated Partial


Thromboplast Time 31.7 SEC 


  


 


 


Blood Urea Nitrogen 91 MG/DL  


 


Creatinine 3.60 MG/DL  


 


Random Glucose 112 MG/DL  


 


Total Protein 6.6 GM/DL  


 


Albumin 2.2 GM/DL  


 


Calcium Level 9.4 MG/DL  


 


Magnesium Level 2.1 MG/DL  


 


Alkaline Phosphatase 119 U/L  


 


Aspartate Amino Transf


(AST/SGOT) 306 U/L 


  


 


 


Alanine Aminotransferase


(ALT/SGPT) 97 U/L 


  


 


 


Total Bilirubin 1.3 MG/DL  


 


Sodium Level 147 MEQ/L  


 


Potassium Level 5.8 MEQ/L  


 


Chloride Level 110 MEQ/L  


 


Carbon Dioxide Level 19.1 MEQ/L  


 


Anion Gap 18 MEQ/L  


 


Estimat Glomerular Filtration


Rate 16 ML/MIN 


  


 


 


Lactic Acid Level 9.0 mmol/L  


 


Total Creatine Kinase 620 U/L  


 


Creatine Kinase MB 7.6 NG/ML  


 


Creatine Kinase MB % 1.2 %  


 


Troponin I 0.16 NG/ML  


 


Urine Color  DARK-YELLOW 


 


Urine Turbidity  HAZY 


 


Urine pH  5.0 


 


Urine Specific Gravity  1.020 


 


Urine Protein  TRACE mg/dL 


 


Urine Glucose (UA)  NEG mg/dL 


 


Urine Ketones  NEG mg/dL 


 


Urine Occult Blood  LARGE 


 


Urine Nitrite  NEG 


 


Urine Bilirubin  NEG 


 


Urine Urobilinogen


  


  LESS THAN 2.0


MG/DL


 


Urine Leukocyte Esterase  NEG 


 


Urine RBC  1 /hpf 


 


Urine WBC  3 /hpf 


 


Urine Amorphous Sediment  OCC 


 


Urine Bacteria  RARE /hpf 


 


Urine Hyaline Casts  1 /lpf 


 


Microscopic Urinalysis Comment


  


  CATH-CULTURE


IND











MDM


Medical Decision Making


Medical Screen Exam Complete:  Yes


Emergency Medical Condition:  Yes


Medical Record Reviewed:  Yes


Interpretation(s)


EKG shows A. fib with RVR at a rate of 150 bpm.  No signs of acute ST-T changes.








Laboratory Tests








Test


  1/13/18


10:50 1/13/18


10:59


 


White Blood Count


  21.8 TH/MM3


(4.0-11.0) 


 


 


Red Blood Count


  3.36 MIL/MM3


(4.50-5.90) 


 


 


Hemoglobin


  9.5 GM/DL


(13.0-17.0) 


 


 


Hematocrit


  30.6 %


(39.0-51.0) 


 


 


Mean Corpuscular Hemoglobin


Concent 31.1 %


(32.0-36.0) 


 


 


Neutrophils (%) (Auto)


  91.7 %


(16.0-70.0) 


 


 


Lymphocytes (%) (Auto)


  4.4 %


(9.0-44.0) 


 


 


Neutrophils # (Auto)


  20.0 TH/MM3


(1.8-7.7) 


 


 


Lymphocytes # (Auto)


  0.9 TH/MM3


(1.0-4.8) 


 


 


Prothrombin Time


  15.5 SEC


(9.8-11.6) 


 


 


Activated Partial


Thromboplast Time 31.7 SEC


(24.3-30.1) 


 


 


Blood Urea Nitrogen


  91 MG/DL


(7-18) 


 


 


Creatinine


  3.60 MG/DL


(0.60-1.30) 


 


 


Random Glucose


  112 MG/DL


() 


 


 


Albumin


  2.2 GM/DL


(3.4-5.0) 


 


 


Alkaline Phosphatase


  119 U/L


() 


 


 


Aspartate Amino Transf


(AST/SGOT) 306 U/L


(15-37) 


 


 


Alanine Aminotransferase


(ALT/SGPT) 97 U/L (12-78) 


  


 


 


Total Bilirubin


  1.3 MG/DL


(0.2-1.0) 


 


 


Sodium Level


  147 MEQ/L


(136-145) 


 


 


Potassium Level


  5.8 MEQ/L


(3.5-5.1) 


 


 


Chloride Level


  110 MEQ/L


() 


 


 


Carbon Dioxide Level


  19.1 MEQ/L


(21.0-32.0) 


 


 


Anion Gap


  18 MEQ/L


(5-15) 


 


 


Estimat Glomerular Filtration


Rate 16 ML/MIN


(>89) 


 


 


Lactic Acid Level


  9.0 mmol/L


(0.4-2.0) 


 


 


Total Creatine Kinase


  620 U/L


() 


 


 


Creatine Kinase MB


  7.6 NG/ML


(0.5-3.6) 


 


 


Troponin I


  0.16 NG/ML


(0.02-0.05) 


 


 


Urine Color


  


  DARK-YELLOW


(YELLW/STRAW)


 


Urine Turbidity  HAZY (CLEAR) 


 


Urine Occult Blood  LARGE (NEG) 


 


Urine Bacteria


  


  RARE /hpf


(NONE)








Last 24 hours Impressions








Head CT 1/13/18 1046 Signed





Impressions: 





 Service Date/Time:  Saturday, January 13, 2018 11:31 - CONCLUSION:  No acute 





 intracranial findings. Chronic ischemic findings.     Rod Tyson MD 


 


Chest X-Ray 1/13/18 1024 Signed





Impressions: 





 Service Date/Time:  Saturday, January 13, 2018 10:48 - CONCLUSION:  Mild 





 bilateral interstitial lower lung opacity. Differential diagnosis includes 

mild 





 pulmonary edema atelectasis, and infection.     Rod Tyson MD 








Differential Diagnosis


Sepsis versus dehydration versus metabolic issues versus acute intra-cranial 

processes


Narrative Course


Considering history, there is great concern for sepsis.  IV fluids were given.  

Antibiotics were initiated after cultures were drawn.  Lab work is confirming 

significant leukocytosis and concern of sepsis.  He appears to be significantly 

dehydrated with worsening renal function as well.  At this point, my plan would 

be to admit the patient for further evaluation and treatment.  Case was 

discussed with Dr. Arnett for admission.





Aggregate critical care time was 35 minutes. Time to perform other separately 

billable procedures was not included in the critical care time. My time did not 

include minutes spent treating any other patients simultaneously or on 

activities that did not directly contribute to the patient's treatment.  





The services I provided to this patient were to treat and/or prevent clinically 

significant deterioration that could result in: Septic shock, acute renal 

failure, worsening dysrhythmia, death





I provided critical care services requiring my management, as noted below:


Chart data review, documentation time, medication orders and management, vital 

sign assessments/reviewing monitor data, ordering and reviewing lab tests, 

ordering and interpreting/reviewing x-rays and diagnostic studies, care of the 

patient and discussion of the patient with the admitting physicians.





Diagnosis





 Primary Impression:  


 Atrial fibrillation with RVR


 Additional Impressions:  


 Severe sepsis


 Acute renal failure





Admitting Information


Admitting Physician Requests:  it











Luke Ayala MD Jan 13, 2018 10:57

## 2018-01-13 NOTE — RADRPT
EXAM DATE/TIME:  01/13/2018 12:40 

 

[facility] COMPARISON:     

No previous studies available for comparison.

 

 

INDICATIONS :     

Leukocytosis of unknown etiology.

                  

 

ORAL CONTRAST:      

No oral contrast ingested.

                  

 

RADIATION DOSE:     

8.81 CTDIvol (mGy) ; Patient motion

 

 

MEDICAL HISTORY :     

Gastroesophageal reflux disease. Cardiovascular disease Dementia.Hypertension.

 

SURGICAL HISTORY :      

None. 

 

ENCOUNTER:      

Initial

 

ACUITY:      

1 day

 

PAIN SCALE:      

Non-responsive

 

LOCATION:        

upper quadrant 

 

TECHNIQUE:     

Volumetric scanning of the abdomen and pelvis was performed.  Using automated exposure control and ad
justment of the mA and/or kV according to patient size, radiation dose was kept as low as reasonably 
achievable to obtain optimal diagnostic quality images.  DICOM format image data is available electro
nically for review and comparison.  

 

FINDINGS:     

 

LOWER LUNGS:     

Consolidation in both lower lobes with air bronchograms. There is no effusion. Patient status post me
flaquita sternotomy and heart size is moderately enlarged. A transvenous pacer is present.

 

LIVER:     

Homogeneous density without lesion.  There is no dilation of the biliary tree.  No calcified gallston
es. There is calcifications present. The gallbladder is at the upper limits of normal in size.

 

SPLEEN:     

Normal size without lesion.

 

PANCREAS:     

Within normal limits. 

 

KIDNEYS:     

Normal in size and shape.  There is no definite solid mass, stone, or hydronephrosis. There are multi
ple bilateral benign appearing renal cysts on the right contains calcification along the wall. Vascul
ar calcifications are present.

 

ADRENAL GLANDS:     

Within normal limits.

 

VASCULAR:     

The patient is status post abdominal aortic aneurysm stent graft placement.

 

BOWEL/MESENTERY:     

Is a nonspecific, nonobstructive bowel gas pattern with multiple loops of nondilated air-containing s
mall bowel with multiple air-fluid levels. There is motion artifact limiting the sensitivity. No oral
 contrast was given. There is a moderate to large amount of stool in the distal colon.

 

ABDOMINAL WALL:     

Within normal limits.

 

RETROPERITONEUM:     

There is no lymphadenopathy.

 

BLADDER:     

No wall thickening or mass.

 

REPRODUCTIVE:     

Within normal limits.

 

INGUINAL:     

There is no lymphadenopathy or hernia.

 

MUSCULOSKELETAL:     

Status post left hip arthroplasty with streak artifact. There is osteopenia, degenerative change and 
mild scoliosis.

 

CONCLUSION:     

1. Nonobstructive bowel gas pattern which could represent a mild ileus. There is a moderate to large 
amount of stool in the distal colon which could indicate constipation.

2. Dense consolidation in both lower lobes concerning for pneumonia.

3. The gallbladder is at the upper limits of normal in size.

4. Status post aortic stent graft placement.

 

 

 

 Mina Morales MD on January 13, 2018 at 13:06           

Board Certified Radiologist.

 This report was verified electronically.

## 2018-01-14 VITALS — OXYGEN SATURATION: 100 %

## 2018-01-14 VITALS
RESPIRATION RATE: 20 BRPM | SYSTOLIC BLOOD PRESSURE: 103 MMHG | HEART RATE: 81 BPM | OXYGEN SATURATION: 96 % | DIASTOLIC BLOOD PRESSURE: 63 MMHG | TEMPERATURE: 97.9 F

## 2018-01-14 VITALS
DIASTOLIC BLOOD PRESSURE: 59 MMHG | RESPIRATION RATE: 24 BRPM | TEMPERATURE: 98.1 F | SYSTOLIC BLOOD PRESSURE: 107 MMHG | OXYGEN SATURATION: 98 % | HEART RATE: 66 BPM

## 2018-01-14 VITALS
SYSTOLIC BLOOD PRESSURE: 106 MMHG | OXYGEN SATURATION: 95 % | RESPIRATION RATE: 22 BRPM | HEART RATE: 87 BPM | TEMPERATURE: 98 F | DIASTOLIC BLOOD PRESSURE: 69 MMHG

## 2018-01-14 VITALS
HEART RATE: 75 BPM | TEMPERATURE: 97.9 F | DIASTOLIC BLOOD PRESSURE: 60 MMHG | RESPIRATION RATE: 26 BRPM | SYSTOLIC BLOOD PRESSURE: 113 MMHG | OXYGEN SATURATION: 96 %

## 2018-01-14 VITALS — HEART RATE: 79 BPM

## 2018-01-14 VITALS — HEART RATE: 75 BPM

## 2018-01-14 VITALS — HEART RATE: 91 BPM

## 2018-01-14 VITALS
RESPIRATION RATE: 29 BRPM | SYSTOLIC BLOOD PRESSURE: 93 MMHG | TEMPERATURE: 98.1 F | DIASTOLIC BLOOD PRESSURE: 51 MMHG | HEART RATE: 74 BPM | OXYGEN SATURATION: 93 %

## 2018-01-14 VITALS — OXYGEN SATURATION: 99 %

## 2018-01-14 VITALS
SYSTOLIC BLOOD PRESSURE: 104 MMHG | TEMPERATURE: 97.9 F | OXYGEN SATURATION: 95 % | HEART RATE: 84 BPM | RESPIRATION RATE: 27 BRPM | DIASTOLIC BLOOD PRESSURE: 56 MMHG

## 2018-01-14 VITALS — HEART RATE: 81 BPM

## 2018-01-14 VITALS — HEART RATE: 72 BPM

## 2018-01-14 LAB
ALBUMIN SERPL-MCNC: 1.7 GM/DL (ref 3.4–5)
ALP SERPL-CCNC: 93 U/L (ref 45–117)
ALT SERPL-CCNC: 365 U/L (ref 12–78)
AST SERPL-CCNC: 810 U/L (ref 15–37)
BASOPHILS # BLD AUTO: 0 TH/MM3 (ref 0–0.2)
BASOPHILS NFR BLD: 0.1 % (ref 0–2)
BILIRUB SERPL-MCNC: 1 MG/DL (ref 0.2–1)
BUN SERPL-MCNC: 96 MG/DL (ref 7–18)
CALCIUM SERPL-MCNC: 8.2 MG/DL (ref 8.5–10.1)
CHLORIDE SERPL-SCNC: 117 MEQ/L (ref 98–107)
CREAT SERPL-MCNC: 3.46 MG/DL (ref 0.6–1.3)
EOSINOPHIL # BLD: 0.1 TH/MM3 (ref 0–0.4)
EOSINOPHIL NFR BLD: 0.8 % (ref 0–4)
ERYTHROCYTE [DISTWIDTH] IN BLOOD BY AUTOMATED COUNT: 17.4 % (ref 11.6–17.2)
GFR SERPLBLD BASED ON 1.73 SQ M-ARVRAT: 17 ML/MIN (ref 89–?)
GLUCOSE SERPL-MCNC: 97 MG/DL (ref 74–106)
HCO3 BLD-SCNC: 24.6 MEQ/L (ref 21–32)
HCT VFR BLD CALC: 24.2 % (ref 39–51)
HGB BLD-MCNC: 7.9 GM/DL (ref 13–17)
INR PPP: 1.7 RATIO
LDH SERPL-CCNC: 547 U/L (ref 87–241)
LYMPHOCYTES # BLD AUTO: 0.6 TH/MM3 (ref 1–4.8)
LYMPHOCYTES NFR BLD AUTO: 6 % (ref 9–44)
MAGNESIUM SERPL-MCNC: 1.8 MG/DL (ref 1.5–2.5)
MCH RBC QN AUTO: 29 PG (ref 27–34)
MCHC RBC AUTO-ENTMCNC: 32.8 % (ref 32–36)
MCV RBC AUTO: 88.4 FL (ref 80–100)
MONOCYTE #: 0.4 TH/MM3 (ref 0–0.9)
MONOCYTES NFR BLD: 3.5 % (ref 0–8)
NEUTROPHILS # BLD AUTO: 9.4 TH/MM3 (ref 1.8–7.7)
NEUTROPHILS NFR BLD AUTO: 89.6 % (ref 16–70)
PHOSPHATE SERPL-MCNC: 2.3 MG/DL (ref 2.5–4.9)
PLATELET # BLD: 138 TH/MM3 (ref 150–450)
PMV BLD AUTO: 8.4 FL (ref 7–11)
PROT SERPL-MCNC: 5 GM/DL (ref 6.4–8.2)
PROTHROMBIN TIME: 17.1 SEC (ref 9.8–11.6)
RANDOM VANCOMYCIN: 12.2 COMMENT
RBC # BLD AUTO: 2.74 MIL/MM3 (ref 4.5–5.9)
SODIUM SERPL-SCNC: 152 MEQ/L (ref 136–145)
SODIUM,RANDOM URINE: 48 MEQ/L
TROPONIN I SERPL-MCNC: 0.25 NG/ML (ref 0.02–0.05)
WBC # BLD AUTO: 10.5 TH/MM3 (ref 4–11)

## 2018-01-14 RX ADMIN — HEPARIN SODIUM SCH UNITS: 10000 INJECTION, SOLUTION INTRAVENOUS; SUBCUTANEOUS at 03:27

## 2018-01-14 RX ADMIN — SODIUM CHLORIDE PRN MLS/HR: 900 INJECTION, SOLUTION INTRAVENOUS at 12:48

## 2018-01-14 RX ADMIN — STANDARDIZED SENNA CONCENTRATE AND DOCUSATE SODIUM SCH TAB: 8.6; 5 TABLET, FILM COATED ORAL at 08:49

## 2018-01-14 RX ADMIN — AZTREONAM SCH MLS/HR: 1 INJECTION, POWDER, LYOPHILIZED, FOR SOLUTION INTRAMUSCULAR; INTRAVENOUS at 16:05

## 2018-01-14 RX ADMIN — MORPHINE SULFATE PRN MG: 2 INJECTION, SOLUTION INTRAMUSCULAR; INTRAVENOUS at 12:03

## 2018-01-14 RX ADMIN — POLYVINYL ALCOHOL SCH DROP: 14 SOLUTION/ DROPS OPHTHALMIC at 12:03

## 2018-01-14 RX ADMIN — INSULIN ASPART SCH: 100 INJECTION, SOLUTION INTRAVENOUS; SUBCUTANEOUS at 17:00

## 2018-01-14 RX ADMIN — STANDARDIZED SENNA CONCENTRATE AND DOCUSATE SODIUM SCH TAB: 8.6; 5 TABLET, FILM COATED ORAL at 20:10

## 2018-01-14 RX ADMIN — IPRATROPIUM BROMIDE AND ALBUTEROL SULFATE SCH AMPULE: .5; 3 SOLUTION RESPIRATORY (INHALATION) at 07:47

## 2018-01-14 RX ADMIN — SODIUM CHLORIDE PRN MLS/HR: 900 INJECTION, SOLUTION INTRAVENOUS at 01:27

## 2018-01-14 RX ADMIN — METOPROLOL TARTRATE SCH MG: 25 TABLET, FILM COATED ORAL at 20:09

## 2018-01-14 RX ADMIN — THIAMINE HYDROCHLORIDE SCH MLS/HR: 100 INJECTION, SOLUTION INTRAMUSCULAR; INTRAVENOUS at 05:18

## 2018-01-14 RX ADMIN — WATER SCH ML: 1 IRRIGANT IRRIGATION at 08:49

## 2018-01-14 RX ADMIN — IPRATROPIUM BROMIDE AND ALBUTEROL SULFATE SCH AMPULE: .5; 3 SOLUTION RESPIRATORY (INHALATION) at 20:30

## 2018-01-14 RX ADMIN — IPRATROPIUM BROMIDE AND ALBUTEROL SULFATE SCH AMPULE: .5; 3 SOLUTION RESPIRATORY (INHALATION) at 16:03

## 2018-01-14 RX ADMIN — VITAMIN D, TAB 1000IU (100/BT) SCH UNITS: 25 TAB at 08:49

## 2018-01-14 RX ADMIN — Medication SCH ML: at 08:51

## 2018-01-14 RX ADMIN — WATER SCH ML: 1 IRRIGANT IRRIGATION at 20:09

## 2018-01-14 RX ADMIN — METOPROLOL TARTRATE SCH MG: 25 TABLET, FILM COATED ORAL at 08:50

## 2018-01-14 RX ADMIN — HEPARIN SODIUM SCH UNITS: 10000 INJECTION, SOLUTION INTRAVENOUS; SUBCUTANEOUS at 15:06

## 2018-01-14 RX ADMIN — CHLORHEXIDINE GLUCONATE SCH PACK: 500 CLOTH TOPICAL at 04:00

## 2018-01-14 RX ADMIN — FERROUS SULFATE TAB 325 MG (65 MG ELEMENTAL FE) SCH MG: 325 (65 FE) TAB at 08:49

## 2018-01-14 RX ADMIN — MIRTAZAPINE SCH MG: 15 TABLET, FILM COATED ORAL at 20:10

## 2018-01-14 RX ADMIN — DONEPEZIL HYDROCHLORIDE SCH MG: 5 TABLET, FILM COATED ORAL at 20:09

## 2018-01-14 RX ADMIN — POLYVINYL ALCOHOL SCH DROP: 14 SOLUTION/ DROPS OPHTHALMIC at 18:00

## 2018-01-14 RX ADMIN — INSULIN ASPART SCH: 100 INJECTION, SOLUTION INTRAVENOUS; SUBCUTANEOUS at 12:00

## 2018-01-14 RX ADMIN — Medication SCH ML: at 20:34

## 2018-01-14 RX ADMIN — SODIUM CHLORIDE SCH MLS/HR: 234 INJECTION INTRAMUSCULAR; INTRAVENOUS; SUBCUTANEOUS at 16:58

## 2018-01-14 RX ADMIN — INSULIN ASPART SCH: 100 INJECTION, SOLUTION INTRAVENOUS; SUBCUTANEOUS at 20:10

## 2018-01-14 RX ADMIN — WATER SCH ML: 1 IRRIGANT IRRIGATION at 12:03

## 2018-01-14 RX ADMIN — INSULIN ASPART SCH: 100 INJECTION, SOLUTION INTRAVENOUS; SUBCUTANEOUS at 08:00

## 2018-01-14 RX ADMIN — IPRATROPIUM BROMIDE AND ALBUTEROL SULFATE SCH AMPULE: .5; 3 SOLUTION RESPIRATORY (INHALATION) at 03:21

## 2018-01-14 RX ADMIN — WATER SCH ML: 1 IRRIGANT IRRIGATION at 18:20

## 2018-01-14 RX ADMIN — THIAMINE HYDROCHLORIDE SCH MLS/HR: 100 INJECTION, SOLUTION INTRAMUSCULAR; INTRAVENOUS at 12:47

## 2018-01-14 RX ADMIN — PANTOPRAZOLE SCH MG: 40 TABLET, DELAYED RELEASE ORAL at 08:49

## 2018-01-14 RX ADMIN — POLYETHYLENE GLYCOL 3350 SCH GM: 17 POWDER, FOR SOLUTION ORAL at 20:09

## 2018-01-14 RX ADMIN — POLYETHYLENE GLYCOL 3350 SCH GM: 17 POWDER, FOR SOLUTION ORAL at 08:50

## 2018-01-14 NOTE — HHI.CCPN
Subjective


Remarks/Hospital Course


89-year-old  male.  Date of admission 1/13/2018.  Past medical history 

includes dementia disorder, chronic atrial fibrillation, hypertension, chronic 

anticoagulation, gastroesophageal reflux disease, constipation.  Patient is a 

resident of [facility].  Patient presents to [facility] after 

subacute onset of fevers, altered mental status.  Patient was transported per 

nursing home request.





Patient is noted to be in A. fib with RVR heart rate in the 140s.  Pelvic 

revealed a creatinine of 3.6.  Potassium of 5.8.  White blood cell count 21,

000.  Elevated INR and PTT, lactic acidosis of 9.0, elevated transaminases, 

elevated CPK and troponin and low albumin.  CT abdomen/pelvis revealed fecal 

impaction, nonobstructive bowel gas pattern in an old aortic graft stent.  

Chest x-ray revealed bilateral lower lobe pneumonia.  Patient received cefepime 

and vancomycin in the emergency department.  We are asked to admit the patient.

  He is currently awake and pleasantly confused on 3 L nasal cannula





Subjective





1/14:  Currently presents a mask at 10 L.  Afebrile.  Coughing up copious 

amounts secretions.  Remains encephalopathic.





Objective





Vital Signs








  Date Time  Temp Pulse Resp B/P (MAP) Pulse Ox O2 Delivery O2 Flow Rate FiO2


 


1/14/18 14:00  72      


 


1/14/18 12:48    99/55    


 


1/14/18 12:00 98.1  29  93   


 


1/14/18 07:47      Nasal Cannula 3.00 


 


1/13/18 17:59        21














Intake and Output   


 


 1/14/18 1/14/18 1/15/18





 08:00 16:00 00:00


 


Intake Total 2375 ml  


 


Output Total 50 ml  


 


Balance 2325 ml  








Result Diagram:  


1/14/18 0335                                                                   

             1/14/18 0335





Other Results





Microbiology








 Date/Time


Source Procedure


Growth Status


 


 


 1/13/18 10:50


Blood Peripheral Aerobic Blood Culture - Preliminary


NO GROWTH IN 1 DAY Resulted


 


 1/13/18 10:50


Blood Peripheral Anaerobic Blood Culture - Preliminary


NO GROWTH IN 1 DAY Resulted


 


 1/13/18 19:54


Nasal Aspirate Influenza Types A,B Antigen (RUFINA) - Final


NEGATIVE FOR FLU A AND B ANTIGEN.... Complete


 


 1/13/18 10:59


Urine Catheterized Urine Urine Culture - Preliminary


NO GROWTH IN 24 HOURS. Resulted








Imaging





Last Impressions








Abdomen/Pelvis CT 1/13/18 1206 Signed





Impressions: 





 Service Date/Time:  Saturday, January 13, 2018 12:40 - CONCLUSION:  1. 





 Nonobstructive bowel gas pattern which could represent a mild ileus. There is 

a 





 moderate to large amount of stool in the distal colon which could indicate 





 constipation. 2. Dense consolidation in both lower lobes concerning for 





 pneumonia. 3. The gallbladder is at the upper limits of normal in size. 4. 





 Status post aortic stent graft placement.     Mina Morales MD 


 


Head CT 1/13/18 1046 Signed





Impressions: 





 Service Date/Time:  Saturday, January 13, 2018 11:31 - CONCLUSION:  No acute 





 intracranial findings. Chronic ischemic findings.     Rod Tyson MD 


 


Chest X-Ray 1/13/18 1024 Signed





Impressions: 





 Service Date/Time:  Saturday, January 13, 2018 10:48 - CONCLUSION:  Mild 





 bilateral interstitial lower lung opacity. Differential diagnosis includes 

mild 





 pulmonary edema atelectasis, and infection.     Rod Tyson MD 








Objective Remarks


GENERAL: 89-year-old  male critically ill currently resting in bed


SKIN:: Dry.  Bilateral stage I heel and stage I sacral decubitus ulcer


HEAD: Atraumatic. Normocephalic. 


EYES: Pupils equal and round about 3 mm bilaterally and reactive. No scleral 

icterus. No injection or drainage. 


ENT: No nasal bleeding or discharge.  Mucous membranes pink and moist.


NECK: Trachea midline. No JVD. 


CARDIOVASCULAR: Tachycardia, IR.  S1, S2 no S4.  Without murmur.  


RESPIRATORY: As crackles appreciated throughout lung fields anteriorly and 

posteriorly. Breath sounds equal bilaterally. 


GASTROINTESTINAL: Abdomen soft, non-tender, nondistended.  Hypoactive bowel 

sounds are appreciated


MUSCULOSKELETAL: Extremities without difficulty and peripheral edema. No 

obvious deformities. 


NEUROLOGICAL: Awake and alert. No obvious cranial nerve deficits.  Moves all 4 

extremities spontaneously.  Follows simple commands.





A/P


Assessment and Plan


Neuro/Psych:





Dementia disorder


 


Acetaminophen 650 mg by mouth every 6 hours.  Fever


Hydrocodone/acetaminophen 5/325 one tablet every 4 hours when necessary pain 1-5


Morphine sulfate 2 mg IV every 8.  Pain 6-10


Continued donepezil 10 mg daily for underlying dementia


Continue mirtazapine 15 mg at night for depression


CT brain revealed no acute intracranial findings





CV: 





Severe sepsis with multisystem organ failure


Atrial fibrillation with rapid ventricular response


Lactic acidosis


Elevated troponin


History of endovascular aortic grafting





Currently diltiazem drip titrated to keep heart rate less than 120.  Weaned off 

today


On metoprolol 25 mg by mouth twice a day


Serial troponin every 6 hours 2.  Elevated 0.25  Check TSH - 1.1


Routine 2-D echocardiogram ordered





Resp: 





Acute respiratory insufficiency secondary to health care associated pneumonia





Currently on simple mask at 8 L to maintain saturations greater than equal to 92

%


Incentive spirometry while awake


Albuterol/ipratropium aerosols every 6 hours with albuterol aerosols every 2 

hours.  Dyspnea


Follow-up chest x-ray in a.m. 1/15





GI: 





Elevated transaminases likely secondary to shock liver


Gastroesophageal reflux disease


Constipation with fecal impaction


Hypoalbuminemia





CT abdomen/pelvis revealed nonobstructive bowel gas pattern, stool in the rectum

, bilateral lower lobe infiltrates in a endovascular aortic stent graft


Check hepatitis panel 


Pantoprazole for GI prophylaxis


Docusate sodium/senna 1 tablet twice a day for bowel regimen


Soapsuds enema, glycerin suppository 1 now.  Large amount stool in colon.





:  





Cleary catheter for accurate I's and O's in a critically ill patient





Endo:  





Sliding-scale insulin with Accu-Cheks before meals and at bedtime to maintain 

euglycemia Novulog 





Renal: 





Acute kidney injury





Urine eosinophils negative


Avoid nephrotoxic drugs


Monitor urine output is slowly picking up after multiple boluses


Accurate I's and O's


Aggressive hydration with crystalloid





Heme:





Thrombocytopenia


Normocytic anemia


Elevated INR/PTT - likely secondary to early DIC with pending fibrinogen


Chronic Rivaroxaban use 10 mg daily





Monitor CBC daily.  Follow trends


No indication for transfusion of blood products at this time


Continue ferrous sulfate 325 mg by mouth daily





ID:





Received cefepime and vancomycin ED





Day #1 vancomycin, aztreonam, levofloxacin and metronidazole





Blood cultures 2, urine Legionella pneumococcal antigen, influenza and sputum 

all ordered.  UA was negative though culture has been sent.  No nitrates or 

leukocyte esterase





MSK:





Recent left hip hemiarthroplasty





Continue cholecalciferol 2000 units daily


PT evaluate and treat





FEN:





Hypernatremia


Hyperkalemia





Received calcium gluconate, D50, insulin, bicarbonate and Kayexalate.  Recheck 

in 3 hours


Switch to one quarter normal saline at 125 cc an hour





Access:


- Utilize peripheral IV.  Central line if indicated





Prophylaxis


- GI - pantoprazole


- DVT - SCDs/ holding  Rivaroxaban today.  Resume likely other type 

pharmacological prophylaxis when clinically indicated





Critical Care:


The total critical care time was 35 minutes. Time to perform other separately 

billable procedures was not included in the critical care time.











Ted Arnett MD Jan 14, 2018 15:19

## 2018-01-14 NOTE — ECHRPT
Indication:   HYPERTENSIVE HEART DISEASE

 

 CONCLUSIONS

 

 The left ventricular systolic function is severely reduced with an estimated ejection fraction in th
e range of 

 25-30%. 

 Normal left ventricular size. 

 Wall thickness is normal. 

 There is global left ventricular dysfunction. 

 oothe left atrial size is moderate-to-severely dilated. 

 kThe right atrial size is moderately dilated. 

 Mild thickening of the mitral valve leaflets. 

 Moderate mitral valve regurgitation. 

 

 Moderate thickening of the aortic valve leaflets. 

 Mild aortic valve stenosis. 

 Aortic valve area is 0.85 cm. 

 Aortic valve mean gradient is 10.8 mmHg. 

 Low gradient aortic stenosis (low mean gradient may be related to cardiomyopathy or severe aortic 

 stenosis).  Consider dobutamine echocardiogram, if need to differentiate.

 

 There is mild tricuspid valve regurgitation. 

 The estimated pulmonary arterial pressure is 96 mmHg. 

 Mild pulmonary valve regurgitation. 

 

 BP:        /         HR:                          Rhythm:           Sinus

 

 MEASUREMENTS  (Male / Female) Normal Values       Technical Quality:Good

 2D ECHO

 LV Diastolic Diameter PLAX        4.8 cm                4.2 - 5.9 / 3.9 - 5.3 cm

 LV Systolic Diameter PLAX         4.3 cm                

 IVS Diastolic Thickness           1.1 cm                0.6 - 1.0 / 0.6 - 0.9 cm

 LVPW Diastolic Thickness          1.1 cm                0.6 - 1.0 / 0.6 - 0.9 cm

 LV Relative Wall Thickness        0.5                   

 RV Internal Dim ED PLAX           2.4 cm                

 LVOT Diameter                     1.8 cm                

 LA Systolic Diameter LX           4.4 cm                3.0 - 4.0 / 2.7 - 3.8 cm

 LV Ejection Fraction MOD 4C       23.1 %                

 LV Ejection Fraction 4C AL        23.8 %                

 

 M-MODE

 Aortic Root Diameter MM           2.8 cm                

 LA Systolic Diameter MM           3.6 cm                

 LA Ao Ratio MM                    1.3                   

 AV Cusp Separation MM             1.3 cm                

 

 DOPPLER

 AV Peak Velocity                  217.5 cm/s            

 AV Peak Gradient                  18.9 mmHg             

 AV Mean Gradient                  10.8 mmHg             

 AV Velocity Time Integral         44.3 cm               

 LVOT Peak Velocity                80.5 cm/s             

 LVOT Peak Gradient                2.6 mmHg              

 LVOT Velocity Time Integral       14.8 cm               

 AV Area Cont Eq vti               0.8 cm               

 AV Area Cont Eq pk                0.9 cm               

 MV Area PHT                       4.2 cm               

 LV E' Lateral Velocity            10.0 cm/s             

 LV E' Septal Velocity             8.7 cm/s              

 TR Peak Velocity                  465.0 cm/s            

 TR Peak Gradient                  86.5 mmHg             

 Right Atrial Pressure             10.0 mmHg             

 Pulmonary Artery Systolic Pressu  96.5 mmHg             

 Right Ventricular Systolic Press  96.5 mmHg             

 PV Peak Velocity                  106.0 cm/s            

 PV Peak Gradient                  4.5 mmHg              

 

 

 FINDINGS

 

 LEFT VENTRICLE

 The left ventricular systolic function is severely reduced with an estimated ejection fraction in th
e range of 

 25-30%. 

 Normal left ventricular size. 

 Wall thickness is normal. 

 There is global left ventricular dysfunction. 

 

 RIGHT VENTRICLE

 Normal right ventricular size and systolic function.  

 

 LEFT ATRIUM

 oothe left atrial size is moderate-to-severely dilated. 

 k

 

 RIGHT ATRIUM

 The right atrial size is moderately dilated. 

 

 ATRIAL SEPTUM

 Normal atrial septal thickness without atrial level shunting by limited color doppler interrogation.
  

 

 AORTA

 The aortic root and proximal ascending aorta are normal in size on limited imaging.  

 

 MITRAL VALVE

 Mild thickening of the mitral valve leaflets. 

 Moderate mitral valve regurgitation. 

 

 AORTIC VALVE

 Trileaflet aortic valve. 

 Moderate thickening of the aortic valve leaflets. 

 Mild aortic valve stenosis. 

 Aortic valve area is 0.85 cm. 

 Aortic valve mean gradient is 10.8 mmHg. 

 Low gradient aortic stenosis (low mean gradient may be related to cardiomyopathy or severe aortic 

 stenosis).

 

 TRICUSPID VALVE

 Structurally normal tricuspid valve. 

 There is mild tricuspid valve regurgitation. 

 The estimated pulmonary arterial pressure is 96 mmHg. 

 

 PULMONARY VALVE

 Mild pulmonary valve regurgitation. 

 

 VESSELS

 The inferior vena cava is normal in size.  

 

 PERICARDIUM

 No pericardial effusion.  

 

 

 

 

  Ant Orellana MD, FACC

  (Electronically Signed)

  Final Date:14 January 2018 19:48

## 2018-01-15 VITALS
HEART RATE: 110 BPM | TEMPERATURE: 98.3 F | SYSTOLIC BLOOD PRESSURE: 119 MMHG | RESPIRATION RATE: 21 BRPM | OXYGEN SATURATION: 96 % | DIASTOLIC BLOOD PRESSURE: 87 MMHG

## 2018-01-15 VITALS — OXYGEN SATURATION: 99 %

## 2018-01-15 VITALS
HEART RATE: 110 BPM | DIASTOLIC BLOOD PRESSURE: 61 MMHG | OXYGEN SATURATION: 82 % | RESPIRATION RATE: 26 BRPM | SYSTOLIC BLOOD PRESSURE: 126 MMHG | TEMPERATURE: 98.4 F

## 2018-01-15 VITALS
TEMPERATURE: 98.4 F | OXYGEN SATURATION: 96 % | SYSTOLIC BLOOD PRESSURE: 129 MMHG | HEART RATE: 110 BPM | RESPIRATION RATE: 20 BRPM | DIASTOLIC BLOOD PRESSURE: 58 MMHG

## 2018-01-15 VITALS
TEMPERATURE: 98.1 F | HEART RATE: 108 BPM | RESPIRATION RATE: 24 BRPM | SYSTOLIC BLOOD PRESSURE: 112 MMHG | DIASTOLIC BLOOD PRESSURE: 63 MMHG | OXYGEN SATURATION: 100 %

## 2018-01-15 VITALS
HEART RATE: 87 BPM | RESPIRATION RATE: 30 BRPM | DIASTOLIC BLOOD PRESSURE: 60 MMHG | SYSTOLIC BLOOD PRESSURE: 107 MMHG | OXYGEN SATURATION: 99 % | TEMPERATURE: 98.2 F

## 2018-01-15 VITALS
TEMPERATURE: 98 F | DIASTOLIC BLOOD PRESSURE: 56 MMHG | HEART RATE: 99 BPM | RESPIRATION RATE: 28 BRPM | SYSTOLIC BLOOD PRESSURE: 118 MMHG | OXYGEN SATURATION: 100 %

## 2018-01-15 VITALS — HEART RATE: 110 BPM

## 2018-01-15 LAB
ALBUMIN SERPL-MCNC: 2 GM/DL (ref 3.4–5)
ALP SERPL-CCNC: 83 U/L (ref 45–117)
ALT SERPL-CCNC: 216 U/L (ref 12–78)
AST SERPL-CCNC: 265 U/L (ref 15–37)
BASOPHILS # BLD AUTO: 0 TH/MM3 (ref 0–0.2)
BASOPHILS NFR BLD: 0.1 % (ref 0–2)
BILIRUB SERPL-MCNC: 1.1 MG/DL (ref 0.2–1)
BUN SERPL-MCNC: 90 MG/DL (ref 7–18)
CALCIUM SERPL-MCNC: 8.1 MG/DL (ref 8.5–10.1)
CHLORIDE SERPL-SCNC: 114 MEQ/L (ref 98–107)
CHOLEST SERPL-MCNC: 68 MG/DL (ref 120–200)
CHOLESTEROL/ HDL RATIO: 8.29 RATIO
CREAT SERPL-MCNC: 3.12 MG/DL (ref 0.6–1.3)
EOSINOPHIL # BLD: 0 TH/MM3 (ref 0–0.4)
EOSINOPHIL NFR BLD: 0.3 % (ref 0–4)
ERYTHROCYTE [DISTWIDTH] IN BLOOD BY AUTOMATED COUNT: 17.4 % (ref 11.6–17.2)
GFR SERPLBLD BASED ON 1.73 SQ M-ARVRAT: 19 ML/MIN (ref 89–?)
GLUCOSE SERPL-MCNC: 89 MG/DL (ref 74–106)
HCO3 BLD-SCNC: 19.8 MEQ/L (ref 21–32)
HCT VFR BLD CALC: 22.9 % (ref 39–51)
HDLC SERPL-MCNC: 8.2 MG/DL (ref 40–60)
HEPATITIS A AB IGM: NEGATIVE
HEPATITIS B CORE AB IGM: NEGATIVE
HEPATITIS B SURFACE ANTIGEN: NEGATIVE
HEPATITIS C AB IGG: NEGATIVE
HGB BLD-MCNC: 7.4 GM/DL (ref 13–17)
LDLC SERPL-MCNC: 28 MG/DL (ref 0–99)
LYMPHOCYTES # BLD AUTO: 0.6 TH/MM3 (ref 1–4.8)
LYMPHOCYTES NFR BLD AUTO: 5.4 % (ref 9–44)
MAGNESIUM SERPL-MCNC: 1.7 MG/DL (ref 1.5–2.5)
MCH RBC QN AUTO: 28.8 PG (ref 27–34)
MCHC RBC AUTO-ENTMCNC: 32.4 % (ref 32–36)
MCV RBC AUTO: 88.9 FL (ref 80–100)
MONOCYTE #: 0.7 TH/MM3 (ref 0–0.9)
MONOCYTES NFR BLD: 5.7 % (ref 0–8)
NEUTROPHILS # BLD AUTO: 10.3 TH/MM3 (ref 1.8–7.7)
NEUTROPHILS NFR BLD AUTO: 88.5 % (ref 16–70)
PHOSPHATE SERPL-MCNC: 2.6 MG/DL (ref 2.5–4.9)
PLATELET # BLD: 118 TH/MM3 (ref 150–450)
PMV BLD AUTO: 8.9 FL (ref 7–11)
PROT SERPL-MCNC: 4.7 GM/DL (ref 6.4–8.2)
RBC # BLD AUTO: 2.58 MIL/MM3 (ref 4.5–5.9)
SODIUM SERPL-SCNC: 145 MEQ/L (ref 136–145)
TRIGL SERPL-MCNC: 158 MG/DL (ref 42–150)
TROPONIN I SERPL-MCNC: 0.17 NG/ML (ref 0.02–0.05)
WBC # BLD AUTO: 11.6 TH/MM3 (ref 4–11)

## 2018-01-15 RX ADMIN — Medication SCH ML: at 21:00

## 2018-01-15 RX ADMIN — HEPARIN SODIUM SCH UNITS: 10000 INJECTION, SOLUTION INTRAVENOUS; SUBCUTANEOUS at 05:44

## 2018-01-15 RX ADMIN — METOPROLOL TARTRATE SCH MG: 25 TABLET, FILM COATED ORAL at 09:00

## 2018-01-15 RX ADMIN — CHLORHEXIDINE GLUCONATE SCH PACK: 500 CLOTH TOPICAL at 04:00

## 2018-01-15 RX ADMIN — LEVOFLOXACIN SCH MLS/HR: 5 INJECTION, SOLUTION INTRAVENOUS at 18:20

## 2018-01-15 RX ADMIN — WATER SCH ML: 1 IRRIGANT IRRIGATION at 13:00

## 2018-01-15 RX ADMIN — MORPHINE SULFATE PRN MG: 2 INJECTION, SOLUTION INTRAMUSCULAR; INTRAVENOUS at 00:10

## 2018-01-15 RX ADMIN — INSULIN ASPART SCH: 100 INJECTION, SOLUTION INTRAVENOUS; SUBCUTANEOUS at 17:00

## 2018-01-15 RX ADMIN — WATER SCH ML: 1 IRRIGANT IRRIGATION at 09:00

## 2018-01-15 RX ADMIN — IPRATROPIUM BROMIDE AND ALBUTEROL SULFATE SCH AMPULE: .5; 3 SOLUTION RESPIRATORY (INHALATION) at 03:11

## 2018-01-15 RX ADMIN — POLYETHYLENE GLYCOL 3350 SCH GM: 17 POWDER, FOR SOLUTION ORAL at 09:00

## 2018-01-15 RX ADMIN — POLYVINYL ALCOHOL SCH DROP: 14 SOLUTION/ DROPS OPHTHALMIC at 09:00

## 2018-01-15 RX ADMIN — WATER SCH ML: 1 IRRIGANT IRRIGATION at 21:00

## 2018-01-15 RX ADMIN — INSULIN ASPART SCH: 100 INJECTION, SOLUTION INTRAVENOUS; SUBCUTANEOUS at 08:00

## 2018-01-15 RX ADMIN — POLYVINYL ALCOHOL SCH DROP: 14 SOLUTION/ DROPS OPHTHALMIC at 13:00

## 2018-01-15 RX ADMIN — INSULIN ASPART SCH: 100 INJECTION, SOLUTION INTRAVENOUS; SUBCUTANEOUS at 21:00

## 2018-01-15 RX ADMIN — VITAMIN D, TAB 1000IU (100/BT) SCH UNITS: 25 TAB at 09:00

## 2018-01-15 RX ADMIN — INSULIN ASPART SCH: 100 INJECTION, SOLUTION INTRAVENOUS; SUBCUTANEOUS at 12:00

## 2018-01-15 RX ADMIN — WATER SCH ML: 1 IRRIGANT IRRIGATION at 18:00

## 2018-01-15 RX ADMIN — IPRATROPIUM BROMIDE AND ALBUTEROL SULFATE SCH AMPULE: .5; 3 SOLUTION RESPIRATORY (INHALATION) at 19:54

## 2018-01-15 RX ADMIN — SODIUM CHLORIDE SCH MLS/HR: 234 INJECTION INTRAMUSCULAR; INTRAVENOUS; SUBCUTANEOUS at 00:10

## 2018-01-15 RX ADMIN — IPRATROPIUM BROMIDE AND ALBUTEROL SULFATE SCH AMPULE: .5; 3 SOLUTION RESPIRATORY (INHALATION) at 15:49

## 2018-01-15 RX ADMIN — FUROSEMIDE SCH MG: 10 INJECTION, SOLUTION INTRAMUSCULAR; INTRAVENOUS at 21:38

## 2018-01-15 RX ADMIN — POLYETHYLENE GLYCOL 3350 SCH GM: 17 POWDER, FOR SOLUTION ORAL at 21:00

## 2018-01-15 RX ADMIN — STANDARDIZED SENNA CONCENTRATE AND DOCUSATE SODIUM SCH TAB: 8.6; 5 TABLET, FILM COATED ORAL at 09:00

## 2018-01-15 RX ADMIN — METOPROLOL TARTRATE SCH MG: 25 TABLET, FILM COATED ORAL at 21:00

## 2018-01-15 RX ADMIN — HEPARIN SODIUM SCH UNITS: 10000 INJECTION, SOLUTION INTRAVENOUS; SUBCUTANEOUS at 15:00

## 2018-01-15 RX ADMIN — SODIUM CHLORIDE SCH MLS/HR: 234 INJECTION INTRAMUSCULAR; INTRAVENOUS; SUBCUTANEOUS at 18:21

## 2018-01-15 RX ADMIN — STANDARDIZED SENNA CONCENTRATE AND DOCUSATE SODIUM SCH TAB: 8.6; 5 TABLET, FILM COATED ORAL at 21:00

## 2018-01-15 RX ADMIN — POLYVINYL ALCOHOL SCH DROP: 14 SOLUTION/ DROPS OPHTHALMIC at 18:00

## 2018-01-15 RX ADMIN — IPRATROPIUM BROMIDE AND ALBUTEROL SULFATE SCH AMPULE: .5; 3 SOLUTION RESPIRATORY (INHALATION) at 07:38

## 2018-01-15 RX ADMIN — FERROUS SULFATE TAB 325 MG (65 MG ELEMENTAL FE) SCH MG: 325 (65 FE) TAB at 09:00

## 2018-01-15 RX ADMIN — AZTREONAM SCH MLS/HR: 1 INJECTION, POWDER, LYOPHILIZED, FOR SOLUTION INTRAMUSCULAR; INTRAVENOUS at 16:00

## 2018-01-15 RX ADMIN — MORPHINE SULFATE PRN MG: 2 INJECTION, SOLUTION INTRAMUSCULAR; INTRAVENOUS at 21:38

## 2018-01-15 RX ADMIN — MIRTAZAPINE SCH MG: 15 TABLET, FILM COATED ORAL at 21:00

## 2018-01-15 RX ADMIN — PANTOPRAZOLE SCH MG: 40 TABLET, DELAYED RELEASE ORAL at 09:00

## 2018-01-15 RX ADMIN — SODIUM CHLORIDE SCH MLS/HR: 234 INJECTION INTRAMUSCULAR; INTRAVENOUS; SUBCUTANEOUS at 17:21

## 2018-01-15 RX ADMIN — Medication SCH ML: at 09:00

## 2018-01-15 RX ADMIN — DONEPEZIL HYDROCHLORIDE SCH MG: 5 TABLET, FILM COATED ORAL at 21:00

## 2018-01-15 NOTE — RADRPT
EXAM DATE/TIME:  01/14/2018 22:12 

 

[facility] COMPARISON:     

CT ABDOMEN & PELVIS W/O CONTRAST, January 13, 2018, 12:40.

        

 

 

INDICATIONS :     

Increased Lab Values.

                     

 

MEDICAL HISTORY :           

Dementia. Cardiac disorder. A.FIB. GERD. Hypertension.

 

SURGICAL HISTORY :          

Cardiac surgery. 

 

ENCOUNTER:     

Initial

 

ACUITY:     

1 day

 

PAIN SCORE:     

Nonresponsive.

 

LOCATION:     

Bilateral upper quadrant 

                     

MEASUREMENTS:     

 

LIVER:     

15.6 cm length 

 

COMMON DUCT:     

5 mm

 

RIGHT KIDNEY:     

11.3 x 5.3 x 5.6 cm

 

SPLEEN:     

9.8 cm length

 

FINDINGS:     

 

LIVER:     

Normal echotexture without focal lesion or ductal dilatation.  

 

COMMON DUCT:     

No intraluminal mass or stone visualized.  

 

GALLBLADDER:     

Multiple gallstones are seen. The gallbladder wall is thickened at 4 mm.

 

PANCREAS:     

The pancreas is obscured by bowel gas.

 

RIGHT KIDNEY:     

There is cortical thinning. There is a 2.4 cm cyst at the mid right kidney.

 

SPLEEN:     

There are tiny echogenic foci likely related to calcifications seen in the spleen.

 

CONCLUSION:     

1. Gallstones with a mildly thickened gallbladder wall. This can be correlated with any clinical sign
s of possible cholecystitis.

2. Right renal cortical thinning would be secondary to medical renal disease. 

 

 

 Dave Keane MD on January 14, 2018 at 23:54           

Board Certified Radiologist.

 This report was verified electronically.

## 2018-01-15 NOTE — RADRPT
EXAM DATE/TIME:  01/15/2018 03:31 

 

[facility] COMPARISON:     

No previous studies available for comparison.

 

                     

INDICATIONS :     

Constipation.

                     

 

MEDICAL HISTORY :     

Gastroesophageal reflux disease.  Cardiovascular disease.  Hypertension.   Dementia   

 

SURGICAL HISTORY :     

None.   

 

ENCOUNTER:     

Subsequent                                        

 

ACUITY:     

3 days      

 

PAIN SCORE:     

Non-responsive.

 

LOCATION:     

Bilateral chest Abdomen

 

FINDINGS:     

There is an aortic stent graft in place. There is a left hip prosthesis present. There is some dilate
d bowel in the right upper quadrant likely related to the colon. Free air is not seen.

 

CONCLUSION:     

Suspected dilatation of the right-sided colon.

 

 

 

 Dave Keane MD on January 15, 2018 at 4:47           

Board Certified Radiologist.

 This report was verified electronically.

## 2018-01-15 NOTE — EKG
Date Performed: 01/13/2018       Time Performed: 10:20:45

 

PTAGE:      89 years

 

EKG:      ATRIAL FIBRILLATION WITH RAPID VENTRICULAR RESPONSE LOW LIMB LEAD VOLTAGE POOR R WAVE PROGR
ESSION POSSIBLE OLD ANTERIOR INFARCT NONSPECIFIC ST T WAVE CHANGES VENTRICULAR RATE HAS ACCELERATED F
ROM THE PRIOR TRACING 

 

 PREVIOUS TRACING            : 12/06/2017 19.46

 

DOCTOR:   Tacos Verdin  Interpretating Date/Time  01/15/2018 08:34:30

## 2018-01-15 NOTE — PD.[email or web address].NOT
Wound Consult


Description:


Consult for wounds to sacrum/heel per Dr Arnett


Communicated with:


Stefan RN


Recommendation:


Leave hydrocolloid on sacrum/coccyx in place for 1 week (reinforce with skin 

prep and tape if needed)


Continue to skin prep all non blanching areas of discoloration from sacrum to 

scrotum BID and PRN for moisture


Manually reposition your patient from left to right sides only every 2 hours


Obtain and place bilateral feet in heel raiser boots to float off mattress 

surface


Apply skin prep to bilateral heel eschars and left medial met head BID and 

leave open to air


Only use 1 ultrasorb pad under patient buttocks for moisture (Do not use cotton 

pads for incontinence with the Bere specialty bed)


Additional Information:


Patient seen on [facility] for wounds to bilateral heels, sacrum, coccyx, and 

anterior anus. Left posterior heel eschar measures 4.3cm x 5.3cm x 100% black 

intact eschar with unremarkable periwound. Left medial met head is noted with a 

non blanching area of erythema indicating a stage 1 pressure injury measuring 

1.3cm x 1.5cm x 0cm. Right lateral heel eschar measures 3.1cm x 4.5cm x 100% 

black intact eschar with discoloration noted to periwound that is cold to 

touch. Patient was positioned to his right side for assessment of sacrum after 

removing soft wrist restraints to reveal purple and maroon non blanching 

discoloration scattered from sacrum to posterior scrotum measuring 23.5cm x 6cm 

x <0.1cm with partial thickness skin loss noted on sacrum without drainage and 

without odor with periwound of wrinkled areas of friable tissue. Sacral area 

was skin prepped and covered with a hydrocolloid that should remain in place 

for 7 days. Recommend to only place patient supine or in a seated position with 

pressure to sacrum and coccyx for therapies. Patient should be manually 

repositioned every 2 hours from left to right sides only. Blue heel raiser 

boots should be placed on bilateral heels to float off mattress surface and 

remain free from pressure. Bere specialty bed is currently in use and there 

are 2 staggered ultrasorbs underneath patient for moisture. Heels were floated 

by placing a pillow underneath calves until boots can be obtained.











[facility]ida [facility] Octavio 15, 2018 16:45

## 2018-01-15 NOTE — HHI.CCPN
Subjective


Remarks/Hospital Course


89-year-old  male.  Date of admission 1/13/2018.  Past medical history 

includes dementia disorder, chronic atrial fibrillation, hypertension, chronic 

anticoagulation, gastroesophageal reflux disease, constipation.  Patient is a 

resident of [facility].  Patient presents to [facility] after 

subacute onset of fevers, altered mental status.  Patient was transported per 

nursing home request.





Patient is noted to be in A. fib with RVR heart rate in the 140s.  Pelvic 

revealed a creatinine of 3.6.  Potassium of 5.8.  White blood cell count 21,

000.  Elevated INR and PTT, lactic acidosis of 9.0, elevated transaminases, 

elevated CPK and troponin and low albumin.  CT abdomen/pelvis revealed fecal 

impaction, nonobstructive bowel gas pattern in an old aortic graft stent.  

Chest x-ray revealed bilateral lower lobe pneumonia.  Patient received cefepime 

and vancomycin in the emergency department.  We are asked to admit the patient.

  He is currently awake and pleasantly confused on 3 L nasal cannula





Subjective





1/14:  Currently presents a mask at 10 L.  Afebrile.  Coughing up copious 

amounts secretions.  Remains encephalopathic.





1/15: Maintaining oxygenation, remains encephalopathic.  Creatinine remains at 

3.  Urine output 325 mm in 24 hours.  Chest x-ray shows diffuse consolidation 

and pulmonary edema.  Attempted diuresis with high-dose Lasix.  Palliative care 

consulted, not a candidate for hemodialysis due to advanced age and dementia. 

Will consult nephrology if family wants aggressive care





Objective





Vital Signs








  Date Time  Temp Pulse Resp B/P (MAP) Pulse Ox O2 Delivery O2 Flow Rate FiO2


 


1/15/18 10:00  110      


 


1/15/18 08:00 98.4  20 129/58 (81) 96   


 


1/15/18 07:40      Simple Mask 8.00 


 


1/13/18 17:59        21














Intake and Output   


 


 1/15/18 1/15/18 1/16/18





 08:00 16:00 00:00


 


Intake Total 1356 ml  


 


Output Total 175 ml  


 


Balance 1181 ml  








Result Diagram:  


1/15/18 0620                                                                   

             1/15/18 0620





Other Results





Microbiology








 Date/Time


Source Procedure


Growth Status


 


 


 1/13/18 19:54


Nasal Aspirate Influenza Types A,B Antigen (RUFINA) - Final


NEGATIVE FOR FLU A AND B ANTIGEN.... Complete


 


 1/13/18 10:59


Urine Catheterized Urine Urine Culture - Final


NO GROWTH IN 48 HOURS. Complete








Imaging





Last Impressions








Abdomen/Pelvis CT 1/13/18 1206 Signed





Impressions: 





 Service Date/Time:  Saturday, January 13, 2018 12:40 - CONCLUSION:  1. 





 Nonobstructive bowel gas pattern which could represent a mild ileus. There is 

a 





 moderate to large amount of stool in the distal colon which could indicate 





 constipation. 2. Dense consolidation in both lower lobes concerning for 





 pneumonia. 3. The gallbladder is at the upper limits of normal in size. 4. 





 Status post aortic stent graft placement.     Mina Morales MD 


 


Head CT 1/13/18 1046 Signed





Impressions: 





 Service Date/Time:  Saturday, January 13, 2018 11:31 - CONCLUSION:  No acute 





 intracranial findings. Chronic ischemic findings.     Rod Tyson MD 


 


Chest X-Ray 1/13/18 1024 Signed





Impressions: 





 Service Date/Time:  Saturday, January 13, 2018 10:48 - CONCLUSION:  Mild 





 bilateral interstitial lower lung opacity. Differential diagnosis includes 

mild 





 pulmonary edema atelectasis, and infection.     Rod Tyson MD 








Objective Remarks


GENERAL: 89-year-old  male critically ill currently resting in bed


SKIN:: Dry.  Bilateral stage I heel and stage I sacral decubitus ulcer


HEAD: Atraumatic. Normocephalic. 


EYES: Pupils equal and round about 3 mm bilaterally and reactive. No scleral 

icterus. No injection or drainage. 


ENT: No nasal bleeding or discharge.  Mucous membranes pink and moist.


NECK: Trachea midline. No JVD. 


CARDIOVASCULAR: Tachycardia, IR.  S1, S2 no S4.  Without murmur.  


RESPIRATORY: Crackles appreciated throughout lung fields anteriorly and 

posteriorly. Breath sounds equal bilaterally. 


GASTROINTESTINAL: Abdomen soft, non-tender, nondistended.  Hypoactive bowel 

sounds are appreciated


MUSCULOSKELETAL: Extremities without difficulty and peripheral edema. No 

obvious deformities. 


NEUROLOGICAL: Awake and alert. No obvious cranial nerve deficits.  Moves all 4 

extremities spontaneously.  Did not follow commands for me





A/P


Assessment and Plan


Neuro/Psych:





Dementia disorder


 


Acetaminophen 650 mg by mouth every 6 hours.  Fever


Hydrocodone/acetaminophen 5/325 one tablet every 4 hours when necessary pain 1-5


Morphine sulfate 2 mg IV every 8.  Pain 6-10


Continued donepezil 10 mg daily for underlying dementia


Continue mirtazapine 15 mg at night for depression


CT brain revealed no acute intracranial findings





CV: 





Severe sepsis with multisystem organ failure


Atrial fibrillation with rapid ventricular response


Cardiomyopathy with EF 25-30%


Congestive heart failure


Lactic acidosis


Elevated troponin


History of endovascular aortic grafting


Continue quarter normal saline for free water replacement. IV Lasix 80 mg x1 

and 40 mg IV q6h


Cardizem drip as needed


On metoprolol 25 mg by mouth twice a day


Serial troponin every 6 hours 2.  Elevated 0.25  Check TSH - 1.1


Routine 2-D echocardiogram ordered-EF 25-30%





Resp: 





Acute respiratory insufficiency secondary to health care associated pneumonia


Pulmonary edema





Currently on simple mask at 8 L to maintain saturations greater than equal to 92

%


Incentive spirometry while awake


Albuterol/ipratropium aerosols every 6 hours with albuterol aerosols every 2 

hours.  Dyspnea


Follow-up chest x-ray in a.m. 1/15





GI: 





Elevated transaminases likely secondary to shock liver


Gastroesophageal reflux disease


Constipation with fecal impaction


Hypoalbuminemia





CT abdomen/pelvis revealed nonobstructive bowel gas pattern, stool in the rectum

, bilateral lower lobe infiltrates in a endovascular aortic stent graft


Pantoprazole for GI prophylaxis


Docusate sodium/senna 1 tablet twice a day for bowel regimen


Soapsuds enema, glycerin suppository 1 now.  Large amount stool in colon.





:  





Cleary catheter for accurate I's and O's in a critically ill patient


IV Lasix for above





Endo:  





Sliding-scale insulin with Accu-Cheks before meals and at bedtime to maintain 

euglycemia Novulog 





Renal: 





Acute kidney injury





Urine eosinophils negative


Avoid nephrotoxic drugs


Accurate I's and O's


Because of worsening pulmonary edema start IV Lasix as above


Quarter normal saline for free water replacement





Heme:





Thrombocytopenia


Normocytic anemia


Elevated INR/PTT - likely secondary to early DIC with pending fibrinogen


Chronic Rivaroxaban use 10 mg daily





Monitor CBC daily.  Follow trends


No indication for transfusion of blood products at this time


Continue ferrous sulfate 325 mg by mouth daily





ID:





Received cefepime and vancomycin ED





Day #2 vancomycin, aztreonam, levofloxacin and metronidazole





Blood cultures 2, urine Legionella pneumococcal antigen, influenza and sputum 

all neg to date.  UA was negative though culture has been sent.  No nitrates or 

leukocyte esterase





MSK:





Recent left hip hemiarthroplasty





Continue cholecalciferol 2000 units daily


PT evaluate and treat





FEN:





Hypernatremia


Hyperkalemia





Quarter normal saline at 125 cc an hour





Access:


- Utilize peripheral IV.  Central line if indicated





Prophylaxis


- GI - pantoprazole


- DVT - SCDs/ holding  Rivaroxaban today.  Resume likely other type 

pharmacological prophylaxis when clinically indicated





Critical Care:


The total critical care time was 35 minutes. Time to perform other separately 

billable procedures was not included in the critical care time.





Patient remains critically ill.  Prognosis extremely poor with EF 25% advanced 

age dementia worsening kidney failure.  Palliative care following











Michael Acosta MD Octavio 15, 2018 14:09

## 2018-01-15 NOTE — RADRPT
EXAM DATE/TIME:  01/15/2018 03:27 

 

[facility] COMPARISON:     

CHEST SINGLE AP, January 13, 2018, 10:48.

 

                     

INDICATIONS :     

Shortness of breath, possible pulmonary disease.

                     

 

MEDICAL HISTORY :     

Hypertension.  Gastroesophageal reflux disease.     A-Fib Dementia   

 

SURGICAL HISTORY :     

CABG.   

 

ENCOUNTER:     

Subsequent                                        

 

ACUITY:     

2 days      

 

PAIN SCORE:     

Non-responsive.

 

LOCATION:     

Bilateral chest 

 

FINDINGS:     

The patient is status post sternotomy. The heart size is enlarged. The lungs demonstrate increased de
nsity seen throughout. There is silhouetting of the hemidiaphragms bilaterally.

 

CONCLUSION:     

Cardiomegaly with diffuse increased density in the lungs likely related to diffuse consolidation and 
edema.

 

 

 

 Dave Keane MD on January 15, 2018 at 4:44           

Board Certified Radiologist.

 This report was verified electronically.

## 2018-01-15 NOTE — PD.CONS
Consult


Service


Palliative Care


Consult Requested By


Dr Arnett


Primary Care Physician


Tyson Maxwell M.D.


Reason for Consultation


   a.  To assist with evaluation and management of symptoms including: pain, 

agitation, dyspnea, dysphagia. 


   b.  To assist medical decision maker(s) with: better understanding of 

current medical conditions; weighing benefits/burdens of medical treatment 

options; making        


        medical treatment decisions.


.





HPI


History of Present Illness


Mr. Rachel is an 89 year old male with past medical history of dementia, chronic 

afib, hypertension, GERD, and recent hip fracture with hemiarthroplasty in 

December 2017. He was residing at a local long-term care facility, [facility]. 





Patient presented to [facility] on 1/13/18 with altered mental status and 

fever. When he arrived, he was found to be in atrial fibrillation with rapid 

ventricular response, rate in the 140s-150s.  Blood work was significant for 

leukocytosis of 21,000 as well as severe renal impairment with creatinine 

greater than 3.  Labs today are significant for Hb 7.4 and mild 

thrombocytopenia of 118K but improving leukocytosis of 11.6.  Chemistries 

continue to show elevated transaminases as well as continuing renal 

dysfunction. CXR shows pneumonia bilateral lower lobes.  Cultures are negative 

to date; rapid flu test negative.  Palliative care was consulted to assist with 

clarification of goals of care in this elderly male with multi organ failure.





He has bilateral heel ulcers as well as a sacral ulcer.  Oxygen is delivered 

via simple face mask but patient does have copious secretions and a frequent 

cough.  Heart rate has improved on diltiazem drip and is now running ; 

diltiazem is off.  Urine output has been poor (325 ml in previous 24 hours).   

Abdominal studies did reveal significant constipation so he is receiving 

Relistor as well as glycerin suppository and enema. 





Today he is resting in bed; eyes are closed.  He does not follow commands nor 

respond to questions but does exhibit purposeful movement and mutters or yells 

with stimulation. Frequent moist cough; has been placed on pureed diet by 

speech therapy.  Decisions about goals of care are as yet undetermined.  Call 

has been placed to DCF  to assist with finding legal decision maker; 

no return call yet.


.


Function/Cognitive Trajectory


Unknown, patient unable to answer questions. 


.





Review of Systems


ROS Limitations:  Altered Mental Status, Poor Historian


Constitutional:  COMPLAINS OF: Weight loss


Respiratory:  COMPLAINS OF: Cough, Shortness of breath


Cardiovascular:  COMPLAINS OF: Palpitations


Gastrointestinal:  COMPLAINS OF: Constipation, Difficulty Swallowing


Musculoskeletal:  COMPLAINS OF: Decreased range of motion


Integumentary:  COMPLAINS OF: Non-healing sores


Neurologic:  COMPLAINS OF: Abnormal gait


Psychiatric:  COMPLAINS OF: Agitation


Other ROS:


Patient is not able to answer any questions; ROS is obtained from medical 

record.





Past Family Social History


Coded Allergies:  


     Penicillins (Verified  Allergy, Severe, 1/13/18)


Past Medical History


Dementia


Atrial fib


Hypertension


GERD


Constipation


Osteoporosis


Past Surgical History


Left hip hemiarthroplasty (December 2017)


Endovascular stent for AAA


Reported Medications


Calcium 600+D 200 (Calcium Carbonate-Vitamin D) 600-200 Mg-Unit Tab 1 Tab PO 

BID 30 Days


Vitamin D3 (Cholecalciferol) 2,000 Unit Cap 2,000 Units PO DAILY


Xarelto (Rivaroxaban) 10 Mg Tab 10 Mg PO DAILY


Hydrocodone-Acetaminophen 7.5 Mg-325 Mg Tab 1 Tab PO Q4H PRN


Walker/Adult/Folding (Device) 1 Mis Mis Ea .ROUTE AS DIRECTED


Reported


Zofran (Ondansetron HCl) 4 Mg Tab 4 Mg PO Q12HR PRN


Ergocalciferol 50,000 Unit Cap 50,000 Units PO Q7D


Imodium A-D (Loperamide HCl) 2 Mg Capsule 2 Mg PO AS DIRECTED PRN


     One capsule after each loose stool.


     Not to exceed 8 tablets per day.


Ferrous Sulfate 325 Mg (65 Mg Iron) Tablet 325 Mg PO DAILY


Donepezil 10 Mg Tab 10 Mg PO HS


Remeron (Mirtazapine) 15 Mg Tab 15 Mg PO HS


Sennosides 8.6 Mg Tab 8.6 Mg PO HS


Metoprolol Tartrate 25 Mg Tab 25 Mg PO BID





Current Medications








 Medications


  (Trade)  Dose


 Ordered  Sig/[place]


 Route  Start Time


 Stop Time Status Last Admin


 


  (Vitamin D3)  2,000 units  DAILY


 PO  1/14/18 09:00


    1/14/18 08:49


 


 


  (Aricept)  10 mg  HS


 PO  1/13/18 21:00


    1/13/18 21:01


 


 


  (Ferrous Sulfate)  325 mg  DAILY


 PO  1/14/18 09:00


    1/14/18 08:49


 


 


  (Lopressor)  25 mg  BID


 PO  1/13/18 21:00


    1/14/18 08:50


 


 


  (Remeron)  15 mg  HS


 PO  1/13/18 21:00


    1/13/18 21:01


 


 


  (Miralax)  17 gm  BID


 PO  1/13/18 21:00


    1/14/18 08:50


 


 


  (Lactulose Liq)  30 ml  QID


 PO  1/13/18 18:00


    1/14/18 18:20


 


 


  (NS Flush)  2 ml  UNSCH  PRN


 IV FLUSH  1/13/18 13:45


     


 


 


  (NS Flush)  2 ml  BID


 IV FLUSH  1/13/18 21:00


    1/14/18 20:34


 


 


  (Tylenol)  650 mg  Q6H  PRN


 PO  1/13/18 13:45


     


 


 


  (Norco  5-325 Mg)  1 tab  Q4H  PRN


 PO  1/13/18 13:45


     


 


 


  (Morphine Inj)  2 mg  Q2H  PRN


 IV PUSH  1/13/18 13:45


    1/15/18 00:10


 


 


  (Protonix)  40 mg  DAILY


 PO  1/14/18 09:00


    1/14/18 08:49


 


 


  (Tears Naturale


 Opth Soln)  1 drop  TID


 EACH EYE  1/13/18 18:00


    1/14/18 12:03


 


 


  (Zofran Inj)  4 mg  Q6H  PRN


 IV PUSH  1/13/18 13:45


    1/14/18 09:58


 


 


  (Duoneb Neb)  1 ampule  Q6HR  NEB


 INH  1/13/18 16:00


    1/15/18 07:38


 


 


  (Albuterol Neb)  2.5 mg  Q2HR NEB  PRN


 INH  1/13/18 13:45


     


 


 


  (Heparin Inj)  5,000 units  Q12H


 SQ  1/13/18 15:00


    1/15/18 05:44


 


 


 Miscellaneous


 Information  1  Q361D


 XX  1/13/18 13:45


     


 


 


  (Chlorhexidine


 2% Cloth)  3 pack


 Taper  DAILY@04


 TOP  1/14/18 04:00


 1/10/19 03:59  1/15/18 04:00


 


 


  (Chlorhexidine


 2% Cloth)  3 pack  UNSCH  PRN


 TOP  1/13/18 13:45


     


 


 


  (Erica-Colace)  1 tab  BID


 PO  1/13/18 21:00


    1/14/18 08:49


 


 


  (Milk Of


 Magnesia Liq)  30 ml  Q12H  PRN


 PO  1/13/18 13:45


     


 


 


  (Senokot)  17.2 mg  Q12H  PRN


 PO  1/13/18 13:45


     


 


 


  (Dulcolax Supp)  10 mg  DAILY  PRN


 RECTAL  1/13/18 13:45


     


 


 


  (Lactulose Liq)  30 ml  DAILY  PRN


 PO  1/13/18 13:45


     


 


 


 Pharmacy Profile


 Note  0 ml @ 0


 mls/hr  UNSCH


 OTHER  1/13/18 13:45


     


 


 


 Aztreonam 1000 mg/


 Sodium Chloride  100 ml @ 


 200 mls/hr  Q24H


 IV  1/13/18 16:00


 1/20/18 15:59  1/14/18 16:05


 


 


  (Flagyl)  500 mg  Q6HR


 PO  1/13/18 18:00


    1/14/18 18:20


 


 


 Levofloxacin/


 Dextrose  100 ml @ 


 100 mls/hr  Q48H


 IV  1/13/18 16:00


    1/13/18 17:29


 


 


  (D50w (Vial) Inj)  50 ml  UNSCH  PRN


 IV PUSH  1/13/18 14:45


     


 


 


  (Glucagon Inj)  1 mg  UNSCH  PRN


 OTHER  1/13/18 14:45


     


 


 


  (NovoLOG


 SUPPLEMENTAL


 SCALE)  1  ACHS SLIDING  SCALE


 SQ  1/13/18 17:00


     


 


 


 Sodium Chloride


 38.5 meq/Sterile


 Water  1,009.625


 ml @ 125


 mls/hr  Q8H5M


 IV  1/14/18 16:00


    1/15/18 00:10


 








Family History


; reportedly worked in [place] but did not work within the [place] 

[place].  No known family.


Substance Use


Tobacco: none per old records


Alcohol:  none per old records


Prescription med abuse:  none per old records


Illicits:  none reported


Spiritual/Cultural Factors


Unknown. 


.


Living Will:  Never completed


Health Care Surrogate:  Never completed


Durable Power of :  Never completed


Ethical and Legal Issues


Attempting to clarify decision maker, awaiting return call from [facility] case 

worker. 


.





Physical Exam





Vital Signs








  Date Time  Temp Pulse Resp B/P (MAP) Pulse Ox O2 Delivery O2 Flow Rate FiO2


 


1/15/18 07:40     99 Simple Mask 8.00 


 


1/15/18 04:00 98.0 99 28 118/56 (76) 100   


 


1/15/18 03:12     99 Simple Mask 8.00 


 


1/15/18 00:00 98.2 87 30 107/60 (76) 99   


 


1/14/18 23:25     99 Simple Mask 8.00 


 


1/14/18 20:30     100 Simple Mask 8.00 


 


1/14/18 20:00 97.9 75 26 113/60 (77) 96   


 


1/14/18 18:00  75      


 


1/14/18 16:00  66      


 


1/14/18 16:00 98.1 66 24 107/59 (75) 98   


 


1/14/18 14:00  72      


 


1/14/18 12:48  64  99/55    


 


1/14/18 12:08   29     


 


1/14/18 12:00 98.1 74 29 93/51 (65) 93   


 


1/14/18 12:00  74      








Exam


CONSTITUTIONAL/GENERAL: This is a thin elderly male resting in bed; he fidgets 

but does not respond directly to any questions nor follow commands


TUBES/LINES/DRAINS: PIV, O2 via NC; Cleary; SCDs; podus boots


SKIN: No jaundice, rashes, or lesions. Bilateral heel ulcers; right foot with 

eschar; left foot open. 


HEAD: Atraumatic. Normocephalic.


EYES: Pupils equal and round and reactive. Extraocular motions intact. No 

scleral icterus. No injection or drainage. Fundi not examined.


ENT: Hearing grossly normal. Nose without bleeding or purulent drainage  

Edentulous; copious oral secretions


NECK: Trachea midline. Supple, nontender. No palpable thyroid enlargement or 

nodularity. 


CARDIOVASCULAR: irregular; no obvious murmurs; peripheral pulses weak but 

palpable


RESPIRATORY/CHEST: Symmetric, unlabored respirations but some tachypnea; 

bilateral fine crackles at the bases; frequent moist cough noted 


GASTROINTESTINAL: Abdomen soft, non-tender, mildly distended; no guarding or 

rebound


GENITOURINARY: Without palpable bladder distension. Cleary catheter in place 

draining clear pale rodney


MUSCULOSKELETAL: Extremities without clubbing, right foot is cool and mildly 

cyanotic but left foot is warm; he yells with any hip movement on either side


LYMPHATICS: No palpable cervical or supraclavicular adenopathy.


NEUROLOGICAL: Does not follow commands, spontaneously open eyes, or answer 

questions.  He does exhibit awareness of tactile stimulation with mild yelling 

and has some spontaneous movement x 4


PSYCHIATRIC: he is fidgety but generally peaceful at rest; becomes agitated 

with stimulation.


.





Diagnostic Tests


Laboratory





Laboratory Tests








Test


  1/13/18


10:50 1/13/18


10:59 1/13/18


17:35 1/13/18


18:54


 


White Blood Count


  21.8 TH/MM3


(4.0-11.0) 


  


  


 


 


Red Blood Count


  3.36 MIL/MM3


(4.50-5.90) 


  


  


 


 


Hemoglobin


  9.5 GM/DL


(13.0-17.0) 


  


  


 


 


Hematocrit


  30.6 %


(39.0-51.0) 


  


  


 


 


Mean Corpuscular Volume


  91.2 FL


(80.0-100.0) 


  


  


 


 


Mean Corpuscular Hemoglobin


  28.4 PG


(27.0-34.0) 


  


  


 


 


Mean Corpuscular Hemoglobin


Concent 31.1 %


(32.0-36.0) 


  


  


 


 


Red Cell Distribution Width


  17.0 %


(11.6-17.2) 


  


  


 


 


Platelet Count


  190 TH/MM3


(150-450) 


  


  


 


 


Mean Platelet Volume


  8.6 FL


(7.0-11.0) 


  


  


 


 


Neutrophils (%) (Auto)


  91.7 %


(16.0-70.0) 


  


  


 


 


Lymphocytes (%) (Auto)


  4.4 %


(9.0-44.0) 


  


  


 


 


Monocytes (%) (Auto)


  3.6 %


(0.0-8.0) 


  


  


 


 


Eosinophils (%) (Auto)


  0.1 %


(0.0-4.0) 


  


  


 


 


Basophils (%) (Auto)


  0.2 %


(0.0-2.0) 


  


  


 


 


Neutrophils # (Auto)


  20.0 TH/MM3


(1.8-7.7) 


  


  


 


 


Lymphocytes # (Auto)


  0.9 TH/MM3


(1.0-4.8) 


  


  


 


 


Monocytes # (Auto)


  0.8 TH/MM3


(0-0.9) 


  


  


 


 


Eosinophils # (Auto)


  0.0 TH/MM3


(0-0.4) 


  


  


 


 


Basophils # (Auto)


  0.0 TH/MM3


(0-0.2) 


  


  


 


 


CBC Comment DIFF FINAL    


 


Differential Comment     


 


Prothrombin Time


  15.5 SEC


(9.8-11.6) 


  


  


 


 


Prothromb Time International


Ratio 1.5 RATIO 


  


  


  


 


 


Activated Partial


Thromboplast Time 31.7 SEC


(24.3-30.1) 


  


  


 


 


Fibrinogen


  586 mg/dL


(227-377) 


  


  


 


 


Blood Urea Nitrogen


  91 MG/DL


(7-18) 


  


  


 


 


Creatinine


  3.60 MG/DL


(0.60-1.30) 


  


  


 


 


Random Glucose


  112 MG/DL


() 


  


  


 


 


Total Protein


  6.6 GM/DL


(6.4-8.2) 


  


  


 


 


Albumin


  2.2 GM/DL


(3.4-5.0) 


  


  


 


 


Calcium Level


  9.4 MG/DL


(8.5-10.1) 


  


  


 


 


Magnesium Level


  2.1 MG/DL


(1.5-2.5) 


  


  


 


 


Alkaline Phosphatase


  119 U/L


() 


  


  


 


 


Aspartate Amino Transf


(AST/SGOT) 306 U/L


(15-37) 


  


  


 


 


Alanine Aminotransferase


(ALT/SGPT) 97 U/L (12-78) 


  


  


  


 


 


Total Bilirubin


  1.3 MG/DL


(0.2-1.0) 


  


  


 


 


Sodium Level


  147 MEQ/L


(136-145) 


  


  


 


 


Potassium Level


  5.8 MEQ/L


(3.5-5.1) 


  4.4 MEQ/L


(3.5-5.1) 


 


 


Chloride Level


  110 MEQ/L


() 


  


  


 


 


Carbon Dioxide Level


  19.1 MEQ/L


(21.0-32.0) 


  


  


 


 


Anion Gap


  18 MEQ/L


(5-15) 


  


  


 


 


Estimat Glomerular Filtration


Rate 16 ML/MIN


(>89) 


  


  


 


 


Lactic Acid Level


  9.0 mmol/L


(0.4-2.0) 


  


  7.2 mmol/L


(0.4-2.0)


 


Total Creatine Kinase


  620 U/L


() 


  


  


 


 


Creatine Kinase MB


  7.6 NG/ML


(0.5-3.6) 


  


  


 


 


Creatine Kinase MB %


  1.2 %


(0.0-4.0) 


  


  


 


 


Troponin I


  0.16 NG/ML


(0.02-0.05) 


  0.22 NG/ML


(0.02-0.05) 


 


 


Urine Color


  


  DARK-YELLOW


(YELLW/STRAW) 


  


 


 


Urine Turbidity  HAZY (CLEAR)   


 


Urine pH  5.0 (5.0-8.5)   


 


Urine Specific Gravity


  


  1.020


(1.002-1.035) 


  


 


 


Urine Protein


  


  TRACE mg/dL


(NEG-TRACE) 


  


 


 


Urine Glucose (UA)


  


  NEG mg/dL


(NEG) 


  


 


 


Urine Ketones


  


  NEG mg/dL


(NEG) 


  


 


 


Urine Occult Blood  LARGE (NEG)   


 


Urine Nitrite  NEG (NEG)   


 


Urine Bilirubin  NEG (NEG)   


 


Urine Urobilinogen


  


  LESS THAN 2.0


MG/DL (LESS 


  


 


 


Urine Leukocyte Esterase  NEG (NEG)   


 


Urine RBC  1 /hpf (0-3)   


 


Urine WBC  3 /hpf (0-5)   


 


Urine Amorphous Sediment  OCC   


 


Urine Bacteria


  


  RARE /hpf


(NONE) 


  


 


 


Urine Hyaline Casts  1 /lpf (RARE)   


 


Microscopic Urinalysis Comment


  


  CATH-CULTURE


IND 


  


 


 


Test


  1/13/18


23:00 1/13/18


23:42 1/14/18


03:35 1/15/18


06:18


 


Urine Eosinophils


  NONE SEEN /HPF


(NONE SEEN) 


  


  


 


 


Urine Random Creatinine 103.5 MG/DL    


 


Urine Random Sodium 48 MEQ/L    


 


Troponin I


  


  0.25 NG/ML


(0.02-0.05) 


  


 


 


Thyroid Stimulating Hormone


3rd Gen 


  1.100 uIU/ML


(0.358-3.740) 


  


 


 


White Blood Count


  


  


  10.5 TH/MM3


(4.0-11.0) 


 


 


Red Blood Count


  


  


  2.74 MIL/MM3


(4.50-5.90) 


 


 


Hemoglobin


  


  


  7.9 GM/DL


(13.0-17.0) 


 


 


Hematocrit


  


  


  24.2 %


(39.0-51.0) 


 


 


Mean Corpuscular Volume


  


  


  88.4 FL


(80.0-100.0) 


 


 


Mean Corpuscular Hemoglobin


  


  


  29.0 PG


(27.0-34.0) 


 


 


Mean Corpuscular Hemoglobin


Concent 


  


  32.8 %


(32.0-36.0) 


 


 


Red Cell Distribution Width


  


  


  17.4 %


(11.6-17.2) 


 


 


Platelet Count


  


  


  138 TH/MM3


(150-450) 


 


 


Mean Platelet Volume


  


  


  8.4 FL


(7.0-11.0) 


 


 


Neutrophils (%) (Auto)


  


  


  89.6 %


(16.0-70.0) 


 


 


Lymphocytes (%) (Auto)


  


  


  6.0 %


(9.0-44.0) 


 


 


Monocytes (%) (Auto)


  


  


  3.5 %


(0.0-8.0) 


 


 


Eosinophils (%) (Auto)


  


  


  0.8 %


(0.0-4.0) 


 


 


Basophils (%) (Auto)


  


  


  0.1 %


(0.0-2.0) 


 


 


Neutrophils # (Auto)


  


  


  9.4 TH/MM3


(1.8-7.7) 


 


 


Lymphocytes # (Auto)


  


  


  0.6 TH/MM3


(1.0-4.8) 


 


 


Monocytes # (Auto)


  


  


  0.4 TH/MM3


(0-0.9) 


 


 


Eosinophils # (Auto)


  


  


  0.1 TH/MM3


(0-0.4) 


 


 


Basophils # (Auto)


  


  


  0.0 TH/MM3


(0-0.2) 


 


 


CBC Comment   DIFF FINAL  


 


Differential Comment     


 


Prothrombin Time


  


  


  17.1 SEC


(9.8-11.6) 


 


 


Prothromb Time International


Ratio 


  


  1.7 RATIO 


  


 


 


Activated Partial


Thromboplast Time 


  


  37.1 SEC


(24.3-30.1) 


 


 


Blood Urea Nitrogen


  


  


  96 MG/DL


(7-18) 


 


 


Creatinine


  


  


  3.46 MG/DL


(0.60-1.30) 


 


 


Random Glucose


  


  


  97 MG/DL


() 


 


 


Total Protein


  


  


  5.0 GM/DL


(6.4-8.2) 


 


 


Albumin


  


  


  1.7 GM/DL


(3.4-5.0) 


 


 


Calcium Level


  


  


  8.2 MG/DL


(8.5-10.1) 


 


 


Phosphorus Level


  


  


  2.3 MG/DL


(2.5-4.9) 


 


 


Magnesium Level


  


  


  1.8 MG/DL


(1.5-2.5) 


 


 


Alkaline Phosphatase


  


  


  93 U/L


() 


 


 


Aspartate Amino Transf


(AST/SGOT) 


  


  810 U/L


(15-37) 


 


 


Alanine Aminotransferase


(ALT/SGPT) 


  


  365 U/L


(12-78) 


 


 


Total Bilirubin


  


  


  1.0 MG/DL


(0.2-1.0) 


 


 


Sodium Level


  


  


  152 MEQ/L


(136-145) 


 


 


Potassium Level


  


  


  3.8 MEQ/L


(3.5-5.1) 


 


 


Chloride Level


  


  


  117 MEQ/L


() 


 


 


Carbon Dioxide Level


  


  


  24.6 MEQ/L


(21.0-32.0) 


 


 


Anion Gap


  


  


  10 MEQ/L


(5-15) 


 


 


Estimat Glomerular Filtration


Rate 


  


  17 ML/MIN


(>89) 


 


 


Lactic Acid Level


  


  


  2.3 mmol/L


(0.4-2.0) 1.5 mmol/L


(0.4-2.0)


 


Ammonia


  


  


  LESS THAN 10


MCMOL/L 


 


 


Lactate Dehydrogenase


  


  


  547 U/L


() 


 


 


Total Creatine Kinase


  


  


  736 U/L


() 


 


 


Creatine Kinase MB


  


  


  8.5 NG/ML


(0.5-3.6) 


 


 


Creatine Kinase MB %


  


  


  1.2 %


(0.0-4.0) 


 


 


Random Vancomycin Level   12.2 COMMENT  


 


Test


  1/15/18


06:20 


  


  


 


 


White Blood Count


  11.6 TH/MM3


(4.0-11.0) 


  


  


 


 


Red Blood Count


  2.58 MIL/MM3


(4.50-5.90) 


  


  


 


 


Hemoglobin


  7.4 GM/DL


(13.0-17.0) 


  


  


 


 


Hematocrit


  22.9 %


(39.0-51.0) 


  


  


 


 


Mean Corpuscular Volume


  88.9 FL


(80.0-100.0) 


  


  


 


 


Mean Corpuscular Hemoglobin


  28.8 PG


(27.0-34.0) 


  


  


 


 


Mean Corpuscular Hemoglobin


Concent 32.4 %


(32.0-36.0) 


  


  


 


 


Red Cell Distribution Width


  17.4 %


(11.6-17.2) 


  


  


 


 


Platelet Count


  118 TH/MM3


(150-450) 


  


  


 


 


Mean Platelet Volume


  8.9 FL


(7.0-11.0) 


  


  


 


 


Neutrophils (%) (Auto)


  88.5 %


(16.0-70.0) 


  


  


 


 


Lymphocytes (%) (Auto)


  5.4 %


(9.0-44.0) 


  


  


 


 


Monocytes (%) (Auto)


  5.7 %


(0.0-8.0) 


  


  


 


 


Eosinophils (%) (Auto)


  0.3 %


(0.0-4.0) 


  


  


 


 


Basophils (%) (Auto)


  0.1 %


(0.0-2.0) 


  


  


 


 


Neutrophils # (Auto)


  10.3 TH/MM3


(1.8-7.7) 


  


  


 


 


Lymphocytes # (Auto)


  0.6 TH/MM3


(1.0-4.8) 


  


  


 


 


Monocytes # (Auto)


  0.7 TH/MM3


(0-0.9) 


  


  


 


 


Eosinophils # (Auto)


  0.0 TH/MM3


(0-0.4) 


  


  


 


 


Basophils # (Auto)


  0.0 TH/MM3


(0-0.2) 


  


  


 


 


CBC Comment DIFF FINAL    


 


Differential Comment     


 


Blood Urea Nitrogen


  90 MG/DL


(7-18) 


  


  


 


 


Creatinine


  3.12 MG/DL


(0.60-1.30) 


  


  


 


 


Random Glucose


  89 MG/DL


() 


  


  


 


 


Total Protein


  4.7 GM/DL


(6.4-8.2) 


  


  


 


 


Albumin


  2.0 GM/DL


(3.4-5.0) 


  


  


 


 


Calcium Level


  8.1 MG/DL


(8.5-10.1) 


  


  


 


 


Phosphorus Level


  2.6 MG/DL


(2.5-4.9) 


  


  


 


 


Magnesium Level


  1.7 MG/DL


(1.5-2.5) 


  


  


 


 


Alkaline Phosphatase


  83 U/L


() 


  


  


 


 


Aspartate Amino Transf


(AST/SGOT) 265 U/L


(15-37) 


  


  


 


 


Alanine Aminotransferase


(ALT/SGPT) 216 U/L


(12-78) 


  


  


 


 


Total Bilirubin


  1.1 MG/DL


(0.2-1.0) 


  


  


 


 


Sodium Level


  145 MEQ/L


(136-145) 


  


  


 


 


Potassium Level


  3.7 MEQ/L


(3.5-5.1) 


  


  


 


 


Chloride Level


  114 MEQ/L


() 


  


  


 


 


Carbon Dioxide Level


  19.8 MEQ/L


(21.0-32.0) 


  


  


 


 


Anion Gap


  11 MEQ/L


(5-15) 


  


  


 


 


Estimat Glomerular Filtration


Rate 19 ML/MIN


(>89) 


  


  


 


 


Total Creatine Kinase


  1098 U/L


() 


  


  


 


 


Creatine Kinase MB


  21.8 NG/ML


(0.5-3.6) 


  


  


 


 


Creatine Kinase MB %


  2.0 %


(0.0-4.0) 


  


  


 


 


Troponin I


  0.17 NG/ML


(0.02-0.05) 


  


  


 


 


Triglycerides Level


  158 MG/DL


() 


  


  


 


 


Cholesterol Level


  68 MG/DL


(120-200) 


  


  


 


 


LDL Cholesterol


  28 MG/DL


(0-99) 


  


  


 


 


HDL Cholesterol


  8.2 MG/DL


(40.0-60.0) 


  


  


 


 


Cholesterol/HDL Ratio 8.29 RATIO    








Result Diagram:  


1/15/18 0620                                                                   

             1/15/18 0620





Microbiology





Microbiology








 Date/Time


Source Procedure


Growth Status


 


 


 1/13/18 10:50


Blood Peripheral Aerobic Blood Culture - Preliminary


NO GROWTH IN 1 DAY Resulted


 


 1/13/18 10:50


Blood Peripheral Anaerobic Blood Culture - Preliminary


NO GROWTH IN 1 DAY Resulted





 1/13/18 10:45


Blood Peripheral Aerobic Blood Culture - Preliminary


NO GROWTH IN 1 DAY Resulted


 


 1/13/18 10:45


Blood Peripheral Anaerobic Blood Culture - Preliminary


NO GROWTH IN 1 DAY Resulted


 


 1/13/18 19:54


Nasal Aspirate Influenza Types A,B Antigen (RUFINA) - Final


NEGATIVE FOR FLU A AND B ANTIGEN.... Complete


 


 1/13/18 10:59


Urine Catheterized Urine Urine Culture - Final


NO GROWTH IN 48 HOURS. Complete








Imaging





Last Impressions








Chest X-Ray 1/15/18 0600 Signed





Impressions: 





 Service Date/Time:  Monday, January 15, 2018 03:27 - CONCLUSION:  Cardiomegaly 





 with diffuse increased density in the lungs likely related to diffuse 





 consolidation and edema.     Dave Keane MD 


 


Abdomen X-Ray 1/15/18 0600 Signed





Impressions: 





 Service Date/Time:  Monday, January 15, 2018 03:31 - CONCLUSION:  Suspected 





 dilatation of the right-sided colon.     Dave Keane MD 


 


Liver Ultrasound 1/14/18 0000 Signed





Impressions: 





 Service Date/Time:  Sunday, January 14, 2018 22:12 - CONCLUSION:  1. 

Gallstones 





 with a mildly thickened gallbladder wall. This can be correlated with any 





 clinical signs of possible cholecystitis. 2. Right renal cortical thinning 

would 





 be secondary to medical renal disease.     Dave Keane MD 


 


Abdomen/Pelvis CT 1/13/18 1206 Signed





Impressions: 





 Service Date/Time:  Saturday, January 13, 2018 12:40 - CONCLUSION:  1. 





 Nonobstructive bowel gas pattern which could represent a mild ileus. There is 

a 





 moderate to large amount of stool in the distal colon which could indicate 





 constipation. 2. Dense consolidation in both lower lobes concerning for 





 pneumonia. 3. The gallbladder is at the upper limits of normal in size. 4. 





 Status post aortic stent graft placement.     Mina Morales MD 


 


Head CT 1/13/18 1046 Signed





Impressions: 





 Service Date/Time:  Saturday, January 13, 2018 11:31 - CONCLUSION:  No acute 





 intracranial findings. Chronic ischemic findings.     Rod Tyson MD 











Assessment and Plan


Disease Oriented Problem List:  


(1) Pain


(2) Dyspnea


(3) Dysphagia


(4) Dementia


(5) Acute renal failure


(6) Severe sepsis


(7) Atrial fibrillation with RVR


(8) Constipation


(9) Anemia


(10) Heel ulcer


(11) Sacral decubitus ulcer


(12) Chronic anticoagulation


Symptom Scale:  


(1) Pain


0-10 Scale:  Unable to quantify


Comment:  Inability to communicate consistently makes it difficult to assess 

pain; he does yell with any movement of hips or legs; has hydrocodone/APAP as 

well as morphine ordered PRN





(2) Dyspnea


0-10 Scale:  Unable to quantify


Comment:  On antibiotics and supplemental oxygen; still has frequent cough





(3) Dysphagia


0-10 Scale:  Unable to quantify (Pureed diet; meds crushed in applesauce)


Comment:  Follow ST recommendations closely; pureed diet with meds crushed in 

applesauce





(4) Agitation


0-10 Scale:  Unable to quantify





Pertinent Non-Medical Issues


Psychosocial: progressive dementia; was residing at [facility] prior to 

admission. From [place], never worked in . 


Spiritual: none known. 


Legal: no family members per chart review; call placed to DCF  for 

clarification of health care decision making process; no return call received


Ethical issues impacting care:  no family members; patient is incapacitated and 

unlikely to regain capacity.  Will await input from [facility] in order to proceed 

with legal process to find a medical decision maker.


.


Important Contacts


[facility]  Jazmin Christian [phone]


Prognosis


Elderly frail gentleman with multisystem failure and worsening dementia; unable 

to participate in self care decisions.  Prognosis at this time appears to be 

days to weeks; PPS between 20-30.


Code Status:  Full Code (await decision maker)


Plan


* Call placed to DCF  Jazmin Everardo for assistance in clarifying 

possible decision makers; no return call yet.


* FULL CODE


* Discussed with nurse, Dr. Dave Aviles and Steffanie, . Left 2 

messages for DCF,  to return call. 


* SYMPTOMS: Pain:  sources include wounds, bedbound status. Dyspnea: on oxygen 

via NC. Agitation: likely multifactorial. No new medication recommendations at 

this time. 


* Palliative care will continue to follow to determine legal decision maker and 

clarify treatment goals.





Thank you for the opportunity to participate in the care of Mr. Rachel.





Attestation


To help prompt me to consider important information that might be impacting 

today's encounter and assessment, information from prior notes written by 

myself or my colleagues may have been "brought forward" into today's note.  My 

signature on this note, however, is an attestation that I personally performed 

the exam, history, and/or decision-making noted today, and, unless otherwise 

indicated, the interactions with patient, family, and staff as well as the 

review of records all occurred today.  I also attest that the listed assessment 

and stated plan reflect my best clinical judgment today based on the 

combination of historical information, prior notes, and today's exam/ 

interactions.  When time spent is documented, it refers only to time spent 

today by the signer, or if indicated, combined time spent today by 

collaborating physician/nurse practitioner.











Diana Benitez Octavio 15, 2018 10:23

## 2018-01-16 VITALS — HEART RATE: 134 BPM

## 2018-01-16 VITALS
OXYGEN SATURATION: 97 % | SYSTOLIC BLOOD PRESSURE: 119 MMHG | HEART RATE: 137 BPM | RESPIRATION RATE: 21 BRPM | DIASTOLIC BLOOD PRESSURE: 59 MMHG

## 2018-01-16 VITALS — OXYGEN SATURATION: 92 %

## 2018-01-16 VITALS
TEMPERATURE: 98.3 F | OXYGEN SATURATION: 100 % | SYSTOLIC BLOOD PRESSURE: 123 MMHG | RESPIRATION RATE: 28 BRPM | DIASTOLIC BLOOD PRESSURE: 58 MMHG | HEART RATE: 128 BPM

## 2018-01-16 VITALS
OXYGEN SATURATION: 99 % | DIASTOLIC BLOOD PRESSURE: 58 MMHG | RESPIRATION RATE: 21 BRPM | SYSTOLIC BLOOD PRESSURE: 127 MMHG | HEART RATE: 131 BPM

## 2018-01-16 VITALS
OXYGEN SATURATION: 99 % | HEART RATE: 116 BPM | DIASTOLIC BLOOD PRESSURE: 61 MMHG | RESPIRATION RATE: 24 BRPM | SYSTOLIC BLOOD PRESSURE: 126 MMHG

## 2018-01-16 VITALS
RESPIRATION RATE: 29 BRPM | OXYGEN SATURATION: 89 % | DIASTOLIC BLOOD PRESSURE: 67 MMHG | HEART RATE: 139 BPM | SYSTOLIC BLOOD PRESSURE: 109 MMHG

## 2018-01-16 VITALS
OXYGEN SATURATION: 100 % | DIASTOLIC BLOOD PRESSURE: 62 MMHG | HEART RATE: 117 BPM | RESPIRATION RATE: 32 BRPM | SYSTOLIC BLOOD PRESSURE: 99 MMHG | TEMPERATURE: 98.6 F

## 2018-01-16 LAB
ALBUMIN SERPL-MCNC: 1.8 GM/DL (ref 3.4–5)
ALP SERPL-CCNC: 87 U/L (ref 45–117)
ALT SERPL-CCNC: 161 U/L (ref 12–78)
AST SERPL-CCNC: 146 U/L (ref 15–37)
BASOPHILS # BLD AUTO: 0 TH/MM3 (ref 0–0.2)
BASOPHILS NFR BLD: 0.1 % (ref 0–2)
BILIRUB SERPL-MCNC: 1.1 MG/DL (ref 0.2–1)
BUN SERPL-MCNC: 87 MG/DL (ref 7–18)
CALCIUM SERPL-MCNC: 7.8 MG/DL (ref 8.5–10.1)
CHLORIDE SERPL-SCNC: 109 MEQ/L (ref 98–107)
CREAT SERPL-MCNC: 3.06 MG/DL (ref 0.6–1.3)
EOSINOPHIL # BLD: 0.1 TH/MM3 (ref 0–0.4)
EOSINOPHIL NFR BLD: 0.4 % (ref 0–4)
ERYTHROCYTE [DISTWIDTH] IN BLOOD BY AUTOMATED COUNT: 17.7 % (ref 11.6–17.2)
GFR SERPLBLD BASED ON 1.73 SQ M-ARVRAT: 19 ML/MIN (ref 89–?)
GLUCOSE SERPL-MCNC: 65 MG/DL (ref 74–106)
HCO3 BLD-SCNC: 17.6 MEQ/L (ref 21–32)
HCT VFR BLD CALC: 26.7 % (ref 39–51)
HGB BLD-MCNC: 8.2 GM/DL (ref 13–17)
INR PPP: 1.8 RATIO
LYMPHOCYTES # BLD AUTO: 0.9 TH/MM3 (ref 1–4.8)
LYMPHOCYTES NFR BLD AUTO: 5 % (ref 9–44)
LYMPHOCYTES: 2 % (ref 9–44)
MAGNESIUM SERPL-MCNC: 1.6 MG/DL (ref 1.5–2.5)
MCH RBC QN AUTO: 28 PG (ref 27–34)
MCHC RBC AUTO-ENTMCNC: 30.8 % (ref 32–36)
MCV RBC AUTO: 91 FL (ref 80–100)
METAMYELOCYTES NFR BLD: 1 % (ref 0–1)
MONOCYTE #: 0.7 TH/MM3 (ref 0–0.9)
MONOCYTES NFR BLD: 3.8 % (ref 0–8)
MONOCYTES: 1 % (ref 0–8)
NEUTROPHILS # BLD AUTO: 16.5 TH/MM3 (ref 1.8–7.7)
NEUTROPHILS NFR BLD AUTO: 90.7 % (ref 16–70)
NEUTS BAND # BLD MANUAL: 17.7 TH/MM3 (ref 1.8–7.7)
NEUTS BAND NFR BLD: 21 % (ref 0–6)
NEUTS SEG NFR BLD MANUAL: 75 % (ref 16–70)
OVALOCYTES BLD QL SMEAR: (no result)
PLATELET # BLD: 129 TH/MM3 (ref 150–450)
PMV BLD AUTO: 8.8 FL (ref 7–11)
PROT SERPL-MCNC: 4.7 GM/DL (ref 6.4–8.2)
PROTHROMBIN TIME: 17.8 SEC (ref 9.8–11.6)
RANDOM VANCOMYCIN: 16.3 COMMENT
RBC # BLD AUTO: 2.94 MIL/MM3 (ref 4.5–5.9)
SCHISTOCYTES BLD QL SMEAR: (no result)
SODIUM SERPL-SCNC: 139 MEQ/L (ref 136–145)
TOXIC GRANULES BLD QL SMEAR: (no result)
WBC # BLD AUTO: 18.2 TH/MM3 (ref 4–11)

## 2018-01-16 RX ADMIN — METOPROLOL TARTRATE SCH MG: 25 TABLET, FILM COATED ORAL at 09:00

## 2018-01-16 RX ADMIN — MORPHINE SULFATE PRN MG: 2 INJECTION, SOLUTION INTRAMUSCULAR; INTRAVENOUS at 04:37

## 2018-01-16 RX ADMIN — POLYVINYL ALCOHOL SCH DROP: 14 SOLUTION/ DROPS OPHTHALMIC at 13:00

## 2018-01-16 RX ADMIN — Medication SCH ML: at 09:00

## 2018-01-16 RX ADMIN — SODIUM CHLORIDE SCH MLS/HR: 234 INJECTION INTRAMUSCULAR; INTRAVENOUS; SUBCUTANEOUS at 01:14

## 2018-01-16 RX ADMIN — POLYVINYL ALCOHOL SCH DROP: 14 SOLUTION/ DROPS OPHTHALMIC at 09:00

## 2018-01-16 RX ADMIN — IPRATROPIUM BROMIDE AND ALBUTEROL SULFATE SCH AMPULE: .5; 3 SOLUTION RESPIRATORY (INHALATION) at 09:25

## 2018-01-16 RX ADMIN — HEPARIN SODIUM SCH UNITS: 10000 INJECTION, SOLUTION INTRAVENOUS; SUBCUTANEOUS at 01:14

## 2018-01-16 RX ADMIN — CHLORHEXIDINE GLUCONATE SCH PACK: 500 CLOTH TOPICAL at 04:00

## 2018-01-16 RX ADMIN — IPRATROPIUM BROMIDE AND ALBUTEROL SULFATE SCH AMPULE: .5; 3 SOLUTION RESPIRATORY (INHALATION) at 14:20

## 2018-01-16 RX ADMIN — INSULIN ASPART SCH: 100 INJECTION, SOLUTION INTRAVENOUS; SUBCUTANEOUS at 08:00

## 2018-01-16 RX ADMIN — FUROSEMIDE SCH MG: 10 INJECTION, SOLUTION INTRAMUSCULAR; INTRAVENOUS at 01:14

## 2018-01-16 RX ADMIN — IPRATROPIUM BROMIDE AND ALBUTEROL SULFATE SCH AMPULE: .5; 3 SOLUTION RESPIRATORY (INHALATION) at 03:45

## 2018-01-16 RX ADMIN — FUROSEMIDE SCH MG: 10 INJECTION, SOLUTION INTRAMUSCULAR; INTRAVENOUS at 08:40

## 2018-01-16 RX ADMIN — STANDARDIZED SENNA CONCENTRATE AND DOCUSATE SODIUM SCH TAB: 8.6; 5 TABLET, FILM COATED ORAL at 09:00

## 2018-01-16 RX ADMIN — SODIUM CHLORIDE SCH MLS/HR: 234 INJECTION INTRAMUSCULAR; INTRAVENOUS; SUBCUTANEOUS at 09:10

## 2018-01-16 NOTE — HHI.CCPN
Subjective


Remarks/Hospital Course


89-year-old  male.  Date of admission 1/13/2018.  Past medical history 

includes dementia disorder, chronic atrial fibrillation, hypertension, chronic 

anticoagulation, gastroesophageal reflux disease, constipation.  Patient is a 

resident of [facility].  Patient presents to [facility] after 

subacute onset of fevers, altered mental status.  Patient was transported per 

nursing home request.





Patient is noted to be in A. fib with RVR heart rate in the 140s.  Pelvic 

revealed a creatinine of 3.6.  Potassium of 5.8.  White blood cell count 21,

000.  Elevated INR and PTT, lactic acidosis of 9.0, elevated transaminases, 

elevated CPK and troponin and low albumin.  CT abdomen/pelvis revealed fecal 

impaction, nonobstructive bowel gas pattern in an old aortic graft stent.  

Chest x-ray revealed bilateral lower lobe pneumonia.  Patient received cefepime 

and vancomycin in the emergency department.  We are asked to admit the patient.

  He is currently awake and pleasantly confused on 3 L nasal cannula





Subjective





1/14:  Currently presents a mask at 10 L.  Afebrile.  Coughing up copious 

amounts secretions.  Remains encephalopathic.





1/15: Maintaining oxygenation, remains encephalopathic.  Creatinine remains at 

3.  Urine output 325 mm in 24 hours.  Chest x-ray shows diffuse consolidation 

and pulmonary edema.  Attempted diuresis with high-dose Lasix.  Palliative care 

consulted, not a candidate for hemodialysis due to advanced age and dementia. 

Will consult nephrology if family wants aggressive care


1/16 Patient is on 10L simple mask, CXR this morning showed diffuse 

consolidation.





Objective





Vital Signs








  Date Time  Temp Pulse Resp B/P (MAP) Pulse Ox O2 Delivery O2 Flow Rate FiO2


 


1/16/18 04:00 98.6 117 32 99/62 (74) 100   


 


1/15/18 19:54      Nasal Cannula 4.00 


 


1/13/18 17:59        21














Intake and Output   


 


 1/16/18 1/16/18 1/17/18





 08:00 16:00 00:00


 


Intake Total 992 ml  


 


Output Total 1200 ml  


 


Balance -208 ml  








Result Diagram:  


1/15/18 0620                                                                   

             1/15/18 0620





Other Results





Laboratory Tests








Test


  1/16/18


06:42








Imaging





Last Impressions








Chest X-Ray 1/16/18 0600 Signed





Impressions: 





 Service Date/Time:  Tuesday, January 16, 2018 04:12 - CONCLUSION:  Diffuse 





 consolidation likely related to edema. The silhouetting of the hemidiaphragms 





 may be secondary to bilateral effusions.     Dave Keane MD 


 


Abdomen X-Ray 1/15/18 0600 Signed





Impressions: 





 Service Date/Time:  Monday, January 15, 2018 03:31 - CONCLUSION:  Suspected 





 dilatation of the right-sided colon.     Dave Keane MD 


 


Liver Ultrasound 1/14/18 0000 Signed





Impressions: 





 Service Date/Time:  Sunday, January 14, 2018 22:12 - CONCLUSION:  1. 

Gallstones 





 with a mildly thickened gallbladder wall. This can be correlated with any 





 clinical signs of possible cholecystitis. 2. Right renal cortical thinning 

would 





 be secondary to medical renal disease.     Dave Keane MD 


 


Abdomen/Pelvis CT 1/13/18 1206 Signed





Impressions: 





 Service Date/Time:  Saturday, January 13, 2018 12:40 - CONCLUSION:  1. 





 Nonobstructive bowel gas pattern which could represent a mild ileus. There is 

a 





 moderate to large amount of stool in the distal colon which could indicate 





 constipation. 2. Dense consolidation in both lower lobes concerning for 





 pneumonia. 3. The gallbladder is at the upper limits of normal in size. 4. 





 Status post aortic stent graft placement.     Mina Morales MD 


 


Head CT 1/13/18 1046 Signed





Impressions: 





 Service Date/Time:  Saturday, January 13, 2018 11:31 - CONCLUSION:  No acute 





 intracranial findings. Chronic ischemic findings.     Rod Tyson MD 








Objective Remarks


GENERAL: 89-year-old  male critically ill currently resting in bed on 

10L simple mask


SKIN:: Dry.  Bilateral stage I heel and stage I sacral decubitus ulcer


HEAD: Atraumatic. Normocephalic. 


EYES: Pupils equal and round about 3 mm bilaterally and reactive. No scleral 

icterus. No injection or drainage. 


ENT: No nasal bleeding or discharge.  Mucous membranes pink and moist.


NECK: Trachea midline. No JVD. 


CARDIOVASCULAR: Tachycardia, IR.  S1, S2 no S4.  Without murmur.  


RESPIRATORY: Crackles appreciated throughout lung fields anteriorly and 

posteriorly. Breath sounds equal bilaterally. 


GASTROINTESTINAL: Abdomen soft, non-tender, nondistended.  Hypoactive bowel 

sounds are appreciated


MUSCULOSKELETAL: Extremities without difficulty and peripheral edema. No 

obvious deformities. 


NEUROLOGICAL: Awake and alert. No obvious cranial nerve deficits.  Moves all 4 

extremities spontaneously.  Did not follow commands for me





A/P


Assessment and Plan


Neuro/Psych:





Dementia disorder


 


Acetaminophen 650 mg by mouth every 6 hours.  Fever


Hydrocodone/acetaminophen 5/325 one tablet every 4 hours when necessary pain 1-5


Morphine sulfate 2 mg IV PRN


Continued donepezil 10 mg daily for underlying dementia


Continue mirtazapine 15 mg at night for depression


CT brain revealed no acute intracranial findings





CV: 





Severe sepsis with multisystem organ failure


Atrial fibrillation with rapid ventricular response


Cardiomyopathy with EF 25-30%


Congestive heart failure


Lactic acidosis


Elevated troponin


History of endovascular aortic grafting


Monitor HR and BP keep MAP>65mmHg


Lactic acid 1.5 on 1/15


On metoprolol 25 mg BID


Mild elevated trop.  TSH - 1.1


Echo: EF 25-30%





Resp: 





Acute respiratory insufficiency secondary to health care associated pneumonia


Pulmonary edema





Currently on simple mask at 10 L to maintain sats> 92%


Albuterol/ipratropium aerosols every 6 hours with albuterol aerosols every 2 

hours.  Dyspnea


CXR today showed diffuse consolidation. 


Check ABG





GI: 





Elevated transaminases likely secondary to shock liver


Gastroesophageal reflux disease


Constipation with fecal impaction


Hypoalbuminemia





CT abdomen/pelvis revealed nonobstructive bowel gas pattern, stool in the rectum

, bilateral lower lobe infiltrates in a endovascular aortic stent graft


Pantoprazole for GI prophylaxis


Docusate sodium/senna 1 tablet twice a day for bowel regimen


Check KUB today. KUB on 1.15: suspected dilation of right colon





:  


NILES


Rhabdo


Monitor renal function, I/O's, avoid nephrotoxins


Cr: 3.12 yesterday from 3.46, check BMP today. UOP: 1900ml in 24 hrs


Urine eosinophils negative


Decrease IVF 1/2NS@75ml/hr, s/p Lasix 80mg x1 yesterday


On Lasix 40mg IV Q6





Endo:  





Sliding-scale insulin with Accu-Cheks before meals and at bedtime to maintain 

euglycemia Novulog 





Heme:





Thrombocytopenia


Normocytic anemia


Elevated INR/PTT -


Chronic Rivaroxaban use 10 mg daily- held





Monitor CBC daily.  Follow trends


No indication for transfusion of blood products at this time


Continue ferrous sulfate 325 mg by mouth daily





ID:





Received cefepime and vancomycin ED





Continue with vancomycin, aztreonam, levofloxacin and metronidazole





Blood cultures 2, urine Legionella pneumococcal antigen, influenza and sputum 

all neg to date.  UA was negative though culture has been sent.  No nitrates or 

leukocyte esterase





MSK:





Recent left hip hemiarthroplasty





Continue cholecalciferol 2000 units daily


PT evaluate and treat





Access:


- Utilize peripheral IV.  Central line if indicated





Prophylaxis


- GI - pantoprazole


- DVT - SCDs/ Heparin SQ





Palliative care following. Patient was made DNR and Hospice service consulted.


Level 3





Patient remains critically ill.  Prognosis extremely poor with EF 25% advanced 

age.











Get Clark MD Jan 16, 2018 07:18

## 2018-01-16 NOTE — RADRPT
EXAM DATE/TIME:  01/16/2018 07:50 

 

[facility] COMPARISON:     

ABDOMEN KUB ONLY, January 15, 2018, 3:31.

 

                     

INDICATIONS :     

Abdominal distention. Evaluate for ileus.

                     

 

MEDICAL HISTORY :            

Gastroesophageal reflux disease. Cardiovascular disease. Hypertension. Dementia.   

 

SURGICAL HISTORY :     

None.   

 

ENCOUNTER:     

Subsequent                                        

 

ACUITY:     

4 - 6 days      

 

PAIN SCORE:     

Non-responsive.

 

LOCATION:       

abdomen

 

FINDINGS:          

Left hip total prosthesis arthroplasty noted in place with degenerative changes of thoracolumbar spin
e. Aortoiliac stent in place. Bibasilar pulmonary opacities representing effusion and possible left l
ower lobe retrocardiac consolidation are stable. Bowel distribution is benign air in the colon withou
t disproportionate air in small bowel and not dilatation.

 

 

CONCLUSION:     Benign abdomen

 

 

 

 Nikita Lara MD on January 16, 2018 at 9:05           

Board Certified Radiologist.

 This report was verified electronically.

## 2018-01-16 NOTE — RADRPT
EXAM DATE/TIME:  01/16/2018 04:12 

 

[facility] COMPARISON:     

CHEST SINGLE AP, January 15, 2018, 3:27.

 

                     

INDICATIONS :     

Short of breath.

                     

 

MEDICAL HISTORY :            

Hypertension. Gastroesophageal reflux disease. A-Fib Dementia   

 

SURGICAL HISTORY :     

CABG.   

 

ENCOUNTER:     

Subsequent                                        

 

ACUITY:     

1 week      

 

PAIN SCORE:     

0/10

 

LOCATION:     

Bilateral  chest

 

FINDINGS:     

The patient is status post sternotomy. The heart size is enlarged. There's increased density seen thr
oughout the mid and lower lungs bilaterally with silhouetting the hemidiaphragms.

 

CONCLUSION:     

Diffuse consolidation likely related to edema. The silhouetting of the hemidiaphragms may be secondar
y to bilateral effusions.

 

 

 

 Dave Keane MD on January 16, 2018 at 5:14           

Board Certified Radiologist.

 This report was verified electronically.

## 2018-01-16 NOTE — HHI.HCPN
Reason for visit


   a.  To assist with evaluation and management of symptoms including: pain, 

agitation, confusion.


   b.  To assist medical decision maker(s) with: better understanding of 

current medical conditions; weighing benefits/burdens of medical treatment 

options; making        


        medical treatment decisions.


.





Subjective/Interval History


Mr. Rachel is an 89 year old male with past medical history of dementia, chronic 

afib, hypertension, GERD, renal insufficiency.  [place] [facility] [place] 

and AMS from his SNF; he was found to be in afib/RVR but also had signs of 

multisystem failure with decreased urine output, creatinine >3, elevated 

transaminases and worsening pneumonia.  





Call from nurse to report patient declining, may need intubation as he is 

requiring more oxygen. Nurse reports [facility]  provided contact 

information for 2 daughters in [place]. 





Patient seen and examined in ICU. No family/friends present. He remains 

demented and unable to answer any questions; today he is in respiratory 

distress but still does not follow commands and becomes quite agitated with 

stimulation. Oxygen needs have increased from oxygen via NC on 1/15 to 12 

liters via simple mask today. When stimulated or removes mask patient 

desaturates into the mid to upper 80s. Heart rate has increased to 150s in 

rapid A. Fib. WBC continues to rise, now 18.2. Renal and liver function remain 

elevated. Albumin 1.8. Chest xray worsening with diffuse consolidation, 

possible edema and bilateral effusions. 





Patient appears to be rapidly declining. Appropriate for hospice services if 

family goals are comfort oriented. Patient is unable to make his own decisions. 


.


Family/friend interactions


Spoke with Jazmin Mcgee [facility]  who indicates she is able to serve 

as HCP, though can only support aggressive goals. Asked if children are able to 

be invited to serve as HCP decision makers as per [place] statutes, she tells 

me yes that would be preferred. 





Patient has two daughters residing in [place]. Telephone conversation with both 

daughters (Aurea and Argelia) who wish to participate in medical decision making 

for their father. Aurea Hale ([phone]) and Argelia Keating ([phone]) are two of three surviving children; they report that their 

brother has been estranged for over 20 years and do not have contact 

information for him.





They report that they have been trying to locate their father and obtain an 

update on his medical condition but due to the holiday weekend they have been 

unable to get in touch with the [facility]  until this morning.  Both 

daughters agree that DNR status, which they pursued on a previous hospital stay 

at a different acute care facility, remains their desire since when their 

father was able to converse, they did have discussions about life support and 

both agree that this would be consistent with their father's wishes.  





Aurea's email (she is the primary ) is [email or web address]





Advance Directives


Living Will:  Never completed


Health Care Surrogate:  Never completed


Durable Power of :  Never completed


Advance Directive Specifics


Health Care Surrogate(s):


Patient does not have capacity to make his own decisions and is unlikely to 

regain capacity.  Per [place] statutes, decision making would then fall to the 

majority of adult children.  Patient has three living children but one son has 

been estranged for over 20 years and no contact information is available for 

him.  The two remaining daughters are Argelia Keating and Aurea Hale, both of 

whom reside in [place].  They are in agreement on pursuit of their father'

s wishes for no life support and comfort measures.





Aurea's contact information:  [phone]


Argelia's contact information:  [phone]





Aurea's email:  [email or web address]  (Aurea is designated as the primary 

.)


Significant change in goals:


NO CODE (DNR/DNI). Transition to comfort care after conversation with two adult 

daughters residing in [place] who both agree that this is in accordance 

with their father's wishes.





Objective





Vital Signs








  Date Time  Temp Pulse Resp B/P (MAP) Pulse Ox O2 Delivery O2 Flow Rate FiO2


 


1/16/18 09:25     92 Simple Mask 12.00 


 


1/16/18 04:00 98.6 117 32 99/62 (74) 100   


 


1/16/18 00:00 98.3 128 28 123/58 (79) 100   


 


1/15/18 20:00 98.1 108 24 112/63 (79) 100   


 


1/15/18 19:54     99 Nasal Cannula 4.00 


 


1/15/18 18:00  110      


 


1/15/18 16:00 98.4 105 26 126/61 (82) 82   


 


1/15/18 16:00  110      


 


1/15/18 14:00  110      


 


1/15/18 12:00  110      


 


1/15/18 12:00 98.3 104 21 119/87 (98) 96   














Intake & Output  


 


 1/16/18 1/16/18





 07:00 19:00


 


Intake Total 992 ml 


 


Output Total 1200 ml 


 


Balance -208 ml 


 


  


 


Intake Oral 0 ml 


 


IV Total 992 ml 


 


Output Urine Total 1200 ml 


 


# Bowel Movements 0 








Physical Exam


CONSTITUTIONAL/GENERAL: This is a thin elderly male resting in bed; he startles 

and becomes agitated with both tactile and auditory stimulation


TUBES/LINES/DRAINS: PIV, O2 via simple mask; Cleary; SCDs; podus boots


SKIN: No jaundice, rashes, or lesions. Bilateral heel ulcers; right foot with 

eschar; left foot open with minimal drainage 


EYES: Pupils equal and round and reactive. Extraocular motions intact. No 

scleral icterus. No injection or drainage. Conjunctivae reddened.


ENT: Hearing grossly normal. Nose without bleeding or purulent drainage  

Edentulous; oral mucosa parched and dry


CARDIOVASCULAR: tachycardic and irregular; no murmurs appreciated


RESPIRATORY/CHEST: Symmetric, unlabored respirations but some tachypnea; 

bilateral fine crackles on right side; generally clear on left; poor 

inspiratory effort 


GASTROINTESTINAL: Abdomen soft, non-tender, not distended; no guarding or 

rebound


GENITOURINARY: Without palpable bladder distension. Cleary catheter in place 

draining clear pale rodney


MUSCULOSKELETAL: Extremities without clubbing, right foot is cool and mildly 

cyanotic but left foot is warm; he yells with any hip movement on either side


NEUROLOGICAL: Does not follow commands, spontaneously open eyes, or answer 

questions.  He does exhibit awareness of tactile stimulation with mild yelling 

and has some spontaneous movement x 4


PSYCHIATRIC: he is fidgety but generally peaceful at rest; becomes agitated 

with stimulation.


.





Diagnostic Tests


Laboratory





Laboratory Tests








Test


  1/13/18


10:50 1/13/18


10:59 1/13/18


17:35 1/13/18


18:54


 


White Blood Count


  21.8 TH/MM3


(4.0-11.0) 


  


  


 


 


Red Blood Count


  3.36 MIL/MM3


(4.50-5.90) 


  


  


 


 


Hemoglobin


  9.5 GM/DL


(13.0-17.0) 


  


  


 


 


Hematocrit


  30.6 %


(39.0-51.0) 


  


  


 


 


Mean Corpuscular Volume


  91.2 FL


(80.0-100.0) 


  


  


 


 


Mean Corpuscular Hemoglobin


  28.4 PG


(27.0-34.0) 


  


  


 


 


Mean Corpuscular Hemoglobin


Concent 31.1 %


(32.0-36.0) 


  


  


 


 


Red Cell Distribution Width


  17.0 %


(11.6-17.2) 


  


  


 


 


Platelet Count


  190 TH/MM3


(150-450) 


  


  


 


 


Mean Platelet Volume


  8.6 FL


(7.0-11.0) 


  


  


 


 


Neutrophils (%) (Auto)


  91.7 %


(16.0-70.0) 


  


  


 


 


Lymphocytes (%) (Auto)


  4.4 %


(9.0-44.0) 


  


  


 


 


Monocytes (%) (Auto)


  3.6 %


(0.0-8.0) 


  


  


 


 


Eosinophils (%) (Auto)


  0.1 %


(0.0-4.0) 


  


  


 


 


Basophils (%) (Auto)


  0.2 %


(0.0-2.0) 


  


  


 


 


Neutrophils # (Auto)


  20.0 TH/MM3


(1.8-7.7) 


  


  


 


 


Lymphocytes # (Auto)


  0.9 TH/MM3


(1.0-4.8) 


  


  


 


 


Monocytes # (Auto)


  0.8 TH/MM3


(0-0.9) 


  


  


 


 


Eosinophils # (Auto)


  0.0 TH/MM3


(0-0.4) 


  


  


 


 


Basophils # (Auto)


  0.0 TH/MM3


(0-0.2) 


  


  


 


 


CBC Comment DIFF FINAL    


 


Differential Comment     


 


Prothrombin Time


  15.5 SEC


(9.8-11.6) 


  


  


 


 


Prothromb Time International


Ratio 1.5 RATIO 


  


  


  


 


 


Activated Partial


Thromboplast Time 31.7 SEC


(24.3-30.1) 


  


  


 


 


Fibrinogen


  586 mg/dL


(227-377) 


  


  


 


 


Blood Urea Nitrogen


  91 MG/DL


(7-18) 


  


  


 


 


Creatinine


  3.60 MG/DL


(0.60-1.30) 


  


  


 


 


Random Glucose


  112 MG/DL


() 


  


  


 


 


Total Protein


  6.6 GM/DL


(6.4-8.2) 


  


  


 


 


Albumin


  2.2 GM/DL


(3.4-5.0) 


  


  


 


 


Calcium Level


  9.4 MG/DL


(8.5-10.1) 


  


  


 


 


Magnesium Level


  2.1 MG/DL


(1.5-2.5) 


  


  


 


 


Alkaline Phosphatase


  119 U/L


() 


  


  


 


 


Aspartate Amino Transf


(AST/SGOT) 306 U/L


(15-37) 


  


  


 


 


Alanine Aminotransferase


(ALT/SGPT) 97 U/L (12-78) 


  


  


  


 


 


Total Bilirubin


  1.3 MG/DL


(0.2-1.0) 


  


  


 


 


Sodium Level


  147 MEQ/L


(136-145) 


  


  


 


 


Potassium Level


  5.8 MEQ/L


(3.5-5.1) 


  4.4 MEQ/L


(3.5-5.1) 


 


 


Chloride Level


  110 MEQ/L


() 


  


  


 


 


Carbon Dioxide Level


  19.1 MEQ/L


(21.0-32.0) 


  


  


 


 


Anion Gap


  18 MEQ/L


(5-15) 


  


  


 


 


Estimat Glomerular Filtration


Rate 16 ML/MIN


(>89) 


  


  


 


 


Lactic Acid Level


  9.0 mmol/L


(0.4-2.0) 


  


  7.2 mmol/L


(0.4-2.0)


 


Total Creatine Kinase


  620 U/L


() 


  


  


 


 


Creatine Kinase MB


  7.6 NG/ML


(0.5-3.6) 


  


  


 


 


Creatine Kinase MB %


  1.2 %


(0.0-4.0) 


  


  


 


 


Troponin I


  0.16 NG/ML


(0.02-0.05) 


  0.22 NG/ML


(0.02-0.05) 


 


 


Urine Color


  


  DARK-YELLOW


(YELLW/STRAW) 


  


 


 


Urine Turbidity  HAZY (CLEAR)   


 


Urine pH  5.0 (5.0-8.5)   


 


Urine Specific Gravity


  


  1.020


(1.002-1.035) 


  


 


 


Urine Protein


  


  TRACE mg/dL


(NEG-TRACE) 


  


 


 


Urine Glucose (UA)


  


  NEG mg/dL


(NEG) 


  


 


 


Urine Ketones


  


  NEG mg/dL


(NEG) 


  


 


 


Urine Occult Blood  LARGE (NEG)   


 


Urine Nitrite  NEG (NEG)   


 


Urine Bilirubin  NEG (NEG)   


 


Urine Urobilinogen


  


  LESS THAN 2.0


MG/DL (LESS 


  


 


 


Urine Leukocyte Esterase  NEG (NEG)   


 


Urine RBC  1 /hpf (0-3)   


 


Urine WBC  3 /hpf (0-5)   


 


Urine Amorphous Sediment  OCC   


 


Urine Bacteria


  


  RARE /hpf


(NONE) 


  


 


 


Urine Hyaline Casts  1 /lpf (RARE)   


 


Microscopic Urinalysis Comment


  


  CATH-CULTURE


IND 


  


 


 


Test


  1/13/18


23:00 1/13/18


23:42 1/14/18


03:35 1/15/18


06:18


 


Urine Eosinophils


  NONE SEEN /HPF


(NONE SEEN) 


  


  


 


 


Urine Random Creatinine 103.5 MG/DL    


 


Urine Random Sodium 48 MEQ/L    


 


Troponin I


  


  0.25 NG/ML


(0.02-0.05) 


  


 


 


Thyroid Stimulating Hormone


3rd Gen 


  1.100 uIU/ML


(0.358-3.740) 


  


 


 


White Blood Count


  


  


  10.5 TH/MM3


(4.0-11.0) 


 


 


Red Blood Count


  


  


  2.74 MIL/MM3


(4.50-5.90) 


 


 


Hemoglobin


  


  


  7.9 GM/DL


(13.0-17.0) 


 


 


Hematocrit


  


  


  24.2 %


(39.0-51.0) 


 


 


Mean Corpuscular Volume


  


  


  88.4 FL


(80.0-100.0) 


 


 


Mean Corpuscular Hemoglobin


  


  


  29.0 PG


(27.0-34.0) 


 


 


Mean Corpuscular Hemoglobin


Concent 


  


  32.8 %


(32.0-36.0) 


 


 


Red Cell Distribution Width


  


  


  17.4 %


(11.6-17.2) 


 


 


Platelet Count


  


  


  138 TH/MM3


(150-450) 


 


 


Mean Platelet Volume


  


  


  8.4 FL


(7.0-11.0) 


 


 


Neutrophils (%) (Auto)


  


  


  89.6 %


(16.0-70.0) 


 


 


Lymphocytes (%) (Auto)


  


  


  6.0 %


(9.0-44.0) 


 


 


Monocytes (%) (Auto)


  


  


  3.5 %


(0.0-8.0) 


 


 


Eosinophils (%) (Auto)


  


  


  0.8 %


(0.0-4.0) 


 


 


Basophils (%) (Auto)


  


  


  0.1 %


(0.0-2.0) 


 


 


Neutrophils # (Auto)


  


  


  9.4 TH/MM3


(1.8-7.7) 


 


 


Lymphocytes # (Auto)


  


  


  0.6 TH/MM3


(1.0-4.8) 


 


 


Monocytes # (Auto)


  


  


  0.4 TH/MM3


(0-0.9) 


 


 


Eosinophils # (Auto)


  


  


  0.1 TH/MM3


(0-0.4) 


 


 


Basophils # (Auto)


  


  


  0.0 TH/MM3


(0-0.2) 


 


 


CBC Comment   DIFF FINAL  


 


Differential Comment     


 


Prothrombin Time


  


  


  17.1 SEC


(9.8-11.6) 


 


 


Prothromb Time International


Ratio 


  


  1.7 RATIO 


  


 


 


Activated Partial


Thromboplast Time 


  


  37.1 SEC


(24.3-30.1) 


 


 


Blood Urea Nitrogen


  


  


  96 MG/DL


(7-18) 


 


 


Creatinine


  


  


  3.46 MG/DL


(0.60-1.30) 


 


 


Random Glucose


  


  


  97 MG/DL


() 


 


 


Total Protein


  


  


  5.0 GM/DL


(6.4-8.2) 


 


 


Albumin


  


  


  1.7 GM/DL


(3.4-5.0) 


 


 


Calcium Level


  


  


  8.2 MG/DL


(8.5-10.1) 


 


 


Phosphorus Level


  


  


  2.3 MG/DL


(2.5-4.9) 


 


 


Magnesium Level


  


  


  1.8 MG/DL


(1.5-2.5) 


 


 


Alkaline Phosphatase


  


  


  93 U/L


() 


 


 


Aspartate Amino Transf


(AST/SGOT) 


  


  810 U/L


(15-37) 


 


 


Alanine Aminotransferase


(ALT/SGPT) 


  


  365 U/L


(12-78) 


 


 


Total Bilirubin


  


  


  1.0 MG/DL


(0.2-1.0) 


 


 


Sodium Level


  


  


  152 MEQ/L


(136-145) 


 


 


Potassium Level


  


  


  3.8 MEQ/L


(3.5-5.1) 


 


 


Chloride Level


  


  


  117 MEQ/L


() 


 


 


Carbon Dioxide Level


  


  


  24.6 MEQ/L


(21.0-32.0) 


 


 


Anion Gap


  


  


  10 MEQ/L


(5-15) 


 


 


Estimat Glomerular Filtration


Rate 


  


  17 ML/MIN


(>89) 


 


 


Lactic Acid Level


  


  


  2.3 mmol/L


(0.4-2.0) 1.5 mmol/L


(0.4-2.0)


 


Ammonia


  


  


  LESS THAN 10


MCMOL/L 


 


 


Lactate Dehydrogenase


  


  


  547 U/L


() 


 


 


Total Creatine Kinase


  


  


  736 U/L


() 


 


 


Creatine Kinase MB


  


  


  8.5 NG/ML


(0.5-3.6) 


 


 


Creatine Kinase MB %


  


  


  1.2 %


(0.0-4.0) 


 


 


Random Vancomycin Level   12.2 COMMENT  


 


Hepatitis A IgM Antibody


  


  


  NEGATIVE


(NEGATIVE) 


 


 


Hepatitis B Surface Antigen


  


  


  NEGATIVE


(NEGATIVE) 


 


 


Hepatitis B Core IgM Antibody


  


  


  NEGATIVE


(NEGATIVE) 


 


 


Hepatitis C Antibody


  


  


  NEGATIVE


(NEGATIVE) 


 


 


Test


  1/15/18


06:20 1/16/18


06:42 1/16/18


07:12 1/16/18


09:12


 


White Blood Count


  11.6 TH/MM3


(4.0-11.0) 18.2 TH/MM3


(4.0-11.0) 


  


 


 


Red Blood Count


  2.58 MIL/MM3


(4.50-5.90) 2.94 MIL/MM3


(4.50-5.90) 


  


 


 


Hemoglobin


  7.4 GM/DL


(13.0-17.0) 8.2 GM/DL


(13.0-17.0) 


  


 


 


Hematocrit


  22.9 %


(39.0-51.0) 26.7 %


(39.0-51.0) 


  


 


 


Mean Corpuscular Volume


  88.9 FL


(80.0-100.0) 91.0 FL


(80.0-100.0) 


  


 


 


Mean Corpuscular Hemoglobin


  28.8 PG


(27.0-34.0) 28.0 PG


(27.0-34.0) 


  


 


 


Mean Corpuscular Hemoglobin


Concent 32.4 %


(32.0-36.0) 30.8 %


(32.0-36.0) 


  


 


 


Red Cell Distribution Width


  17.4 %


(11.6-17.2) 17.7 %


(11.6-17.2) 


  


 


 


Platelet Count


  118 TH/MM3


(150-450) 129 TH/MM3


(150-450) 


  


 


 


Mean Platelet Volume


  8.9 FL


(7.0-11.0) 8.8 FL


(7.0-11.0) 


  


 


 


Neutrophils (%) (Auto)


  88.5 %


(16.0-70.0) 90.7 %


(16.0-70.0) 


  


 


 


Lymphocytes (%) (Auto)


  5.4 %


(9.0-44.0) 5.0 %


(9.0-44.0) 


  


 


 


Monocytes (%) (Auto)


  5.7 %


(0.0-8.0) 3.8 %


(0.0-8.0) 


  


 


 


Eosinophils (%) (Auto)


  0.3 %


(0.0-4.0) 0.4 %


(0.0-4.0) 


  


 


 


Basophils (%) (Auto)


  0.1 %


(0.0-2.0) 0.1 %


(0.0-2.0) 


  


 


 


Neutrophils # (Auto)


  10.3 TH/MM3


(1.8-7.7) 16.5 TH/MM3


(1.8-7.7) 


  


 


 


Lymphocytes # (Auto)


  0.6 TH/MM3


(1.0-4.8) 0.9 TH/MM3


(1.0-4.8) 


  


 


 


Monocytes # (Auto)


  0.7 TH/MM3


(0-0.9) 0.7 TH/MM3


(0-0.9) 


  


 


 


Eosinophils # (Auto)


  0.0 TH/MM3


(0-0.4) 0.1 TH/MM3


(0-0.4) 


  


 


 


Basophils # (Auto)


  0.0 TH/MM3


(0-0.2) 0.0 TH/MM3


(0-0.2) 


  


 


 


CBC Comment DIFF FINAL  AUTO DIFF   


 


Differential Comment


   


  FINAL DIFF


MANUAL 


  


 


 


Blood Urea Nitrogen


  90 MG/DL


(7-18) 87 MG/DL


(7-18) 


  


 


 


Creatinine


  3.12 MG/DL


(0.60-1.30) 3.06 MG/DL


(0.60-1.30) 


  


 


 


Random Glucose


  89 MG/DL


() 65 MG/DL


() 


  


 


 


Total Protein


  4.7 GM/DL


(6.4-8.2) 4.7 GM/DL


(6.4-8.2) 


  


 


 


Albumin


  2.0 GM/DL


(3.4-5.0) 1.8 GM/DL


(3.4-5.0) 


  


 


 


Calcium Level


  8.1 MG/DL


(8.5-10.1) 7.8 MG/DL


(8.5-10.1) 


  


 


 


Phosphorus Level


  2.6 MG/DL


(2.5-4.9) 


  


  


 


 


Magnesium Level


  1.7 MG/DL


(1.5-2.5) 1.6 MG/DL


(1.5-2.5) 


  


 


 


Alkaline Phosphatase


  83 U/L


() 87 U/L


() 


  


 


 


Aspartate Amino Transf


(AST/SGOT) 265 U/L


(15-37) 146 U/L


(15-37) 


  


 


 


Alanine Aminotransferase


(ALT/SGPT) 216 U/L


(12-78) 161 U/L


(12-78) 


  


 


 


Total Bilirubin


  1.1 MG/DL


(0.2-1.0) 1.1 MG/DL


(0.2-1.0) 


  


 


 


Sodium Level


  145 MEQ/L


(136-145) 139 MEQ/L


(136-145) 


  


 


 


Potassium Level


  3.7 MEQ/L


(3.5-5.1) 4.0 MEQ/L


(3.5-5.1) 


  


 


 


Chloride Level


  114 MEQ/L


() 109 MEQ/L


() 


  


 


 


Carbon Dioxide Level


  19.8 MEQ/L


(21.0-32.0) 17.6 MEQ/L


(21.0-32.0) 


  


 


 


Anion Gap


  11 MEQ/L


(5-15) 12 MEQ/L


(5-15) 


  


 


 


Estimat Glomerular Filtration


Rate 19 ML/MIN


(>89) 19 ML/MIN


(>89) 


  


 


 


Total Creatine Kinase


  1098 U/L


() 


  


  826 U/L


()


 


Creatine Kinase MB


  21.8 NG/ML


(0.5-3.6) 


  


  19.8 NG/ML


(0.5-3.6)


 


Creatine Kinase MB %


  2.0 %


(0.0-4.0) 


  


  2.4 %


(0.0-4.0)


 


Troponin I


  0.17 NG/ML


(0.02-0.05) 


  


  


 


 


Triglycerides Level


  158 MG/DL


() 


  


  


 


 


Cholesterol Level


  68 MG/DL


(120-200) 


  


  


 


 


LDL Cholesterol


  28 MG/DL


(0-99) 


  


  


 


 


HDL Cholesterol


  8.2 MG/DL


(40.0-60.0) 


  


  


 


 


Cholesterol/HDL Ratio 8.29 RATIO    


 


Differential Total Cells


Counted 


  100 


  


  


 


 


Neutrophils % (Manual)  75 % (16-70)   


 


Band Neutrophils %  21 % (0-6)   


 


Lymphocytes %  2 % (9-44)   


 


Monocytes %  1 % (0-8)   


 


Neutrophils # (Manual)


  


  17.7 TH/MM3


(1.8-7.7) 


  


 


 


Metamyelocytes  1 % (0-1)   


 


Toxic Granulation  2+ (NORMAL)   


 


Ovalocytes  1+ (NORMAL)   


 


Keratocytes  OCC (NORMAL)   


 


Random Vancomycin Level  16.3 COMMENT   


 


Blood Gas Puncture Site   RT RADIAL  


 


Blood Gas Patient Temperature   98.6  


 


Blood Gas HCO3


  


  


  18 mmol/L


(22-26) 


 


 


Blood Gas Base Excess


  


  


  -6.6 mmol/L


(-2-2) 


 


 


Blood Gas Oxygen Saturation   93 % ()  


 


Arterial Blood pH


  


  


  7.35


(7.380-7.420) 


 


 


Arterial Blood Partial


Pressure CO2 


  


  34 mmHg


(38-42) 


 


 


Arterial Blood Partial


Pressure O2 


  


  77 mmHg


() 


 


 


Arterial Blood Oxygen Content


  


  


  10.2 Vol %


(12.0-20.0) 


 


 


Arterial Blood


Carboxyhemoglobin 


  


  1.2 % (0-4) 


  


 


 


Arterial Blood Methemoglobin   1.1 % (0-2)  


 


Blood Gas Hemoglobin


  


  


  7.7 G/DL


(12.0-16.0) 


 


 


Oxygen Delivery Device   SIMPLE MASK  


 


Blood Gas Liter Flow   10 L/M  


 


Prothrombin Time


  


  


  


  17.8 SEC


(9.8-11.6)


 


Prothromb Time International


Ratio 


  


  


  1.8 RATIO 


 








Result Diagram:  


1/16/18 0642                                                                   

             1/16/18 0642





Microbiology





Microbiology








 Date/Time


Source Procedure


Growth Status


 


 


 1/13/18 10:50


Blood Peripheral Aerobic Blood Culture - Preliminary


NO GROWTH IN 2 DAYS Resulted


 


 1/13/18 10:50


Blood Peripheral Anaerobic Blood Culture - Preliminary


NO GROWTH IN 2 DAYS Resulted





 1/13/18 10:45


Blood Peripheral Aerobic Blood Culture - Preliminary


NO GROWTH IN 2 DAYS Resulted


 


 1/13/18 10:45


Blood Peripheral Anaerobic Blood Culture - Preliminary


NO GROWTH IN 2 DAYS Resulted


 


 1/13/18 19:54


Nasal Aspirate Influenza Types A,B Antigen (RUFINA) - Final


NEGATIVE FOR FLU A AND B ANTIGEN.... Complete


 


 1/13/18 10:59


Urine Catheterized Urine Urine Culture - Final


NO GROWTH IN 48 HOURS. Complete








Imaging





Last Impressions








Chest X-Ray 1/16/18 0600 Signed





Impressions: 





 Service Date/Time:  Tuesday, January 16, 2018 04:12 - CONCLUSION:  Diffuse 





 consolidation likely related to edema. The silhouetting of the hemidiaphragms 





 may be secondary to bilateral effusions.     Dave Keane MD 


 


Abdomen X-Ray 1/16/18 0000 Signed





Impressions: 





 Service Date/Time:  Tuesday, January 16, 2018 07:50 - CONCLUSION: Benign 

abdomen 





     Nikita Lara MD 


 


Liver Ultrasound 1/14/18 0000 Signed





Impressions: 





 Service Date/Time:  Sunday, January 14, 2018 22:12 - CONCLUSION:  1. 

Gallstones 





 with a mildly thickened gallbladder wall. This can be correlated with any 





 clinical signs of possible cholecystitis. 2. Right renal cortical thinning 

would 





 be secondary to medical renal disease.     Dave Keane MD 


 


Abdomen/Pelvis CT 1/13/18 1206 Signed





Impressions: 





 Service Date/Time:  Saturday, January 13, 2018 12:40 - CONCLUSION:  1. 





 Nonobstructive bowel gas pattern which could represent a mild ileus. There is 

a 





 moderate to large amount of stool in the distal colon which could indicate 





 constipation. 2. Dense consolidation in both lower lobes concerning for 





 pneumonia. 3. The gallbladder is at the upper limits of normal in size. 4. 





 Status post aortic stent graft placement.     Mina Morales MD 


 


Head CT 1/13/18 1046 Signed





Impressions: 





 Service Date/Time:  Saturday, January 13, 2018 11:31 - CONCLUSION:  No acute 





 intracranial findings. Chronic ischemic findings.     Rod Tyson MD 











Assessment and Plan


Disease Oriented Problem List:  


(1) Pain


(2) Dyspnea


(3) Dysphagia


(4) Dementia


(5) Acute renal failure


(6) Severe sepsis


(7) Atrial fibrillation with RVR


(8) Constipation


(9) Anemia


(10) Heel ulcer


(11) Sacral decubitus ulcer


(12) Chronic anticoagulation


Symptom Scale:  


(1) Pain


0-10 Scale:  Unable to quantify


Comment:  Inability to communicate consistently makes it difficult to assess 

pain; he does yell with any movement of hips or legs; has hydrocodone/APAP as 

well as morphine ordered PRN





(2) Dyspnea


0-10 Scale:  Unable to quantify


Comment:  On antibiotics and supplemental oxygen; still has frequent cough





(3) Dementia


0-10 Scale:  Unable to quantify


Comment:  Unable to communicate or to make needs known; unable to make self-

care decisions





(4) Dysphagia


0-10 Scale:  Unable to quantify (Pureed diet; meds crushed in applesauce)


Comment:  Follow ST recommendations closely; pureed diet with meds crushed in 

applesauce





Pertinent Non-Medical Issues


Psychosocial: progressive dementia; was residing at [facility] prior to 

admission. From [place], never worked in [place].  Moved here 24 years ago; 

widowered in the interim leaving no surviving family members locally


Spiritual: none known. 


Legal: two adult daughters residing in [place] are decision makers; they 

both support transition to comfort care with hospice services.


Ethical issues impacting care:  Patient does not have capacity to make his own 

decisions and is unlikely to regain capacity.  Per [place] statutes, decision 

making would then fall to the majority of adult children.  Patient has three 

living children but one son has been estranged for over 20 years and no contact 

information is available for him.  The two remaining daughters are Argelia Keating and Aurea Hale, both of whom reside in [place].  They are in 

agreement on pursuit of their father's wishes for no life support and comfort 

measures.





Aurea's contact information:  [phone]


Argelia's contact information:  [phone] 





Aurea's email:  [email or web address]  (Aurea is designated as the primary 

.)


.


Important Contacts


*  Aurea Hale, daughter/co-HCP: ([phone]) lives in [place] --- 

contact first per family request. 


*  Argelia Keating, daughter/co-HCP:  ([phone]) lives in [place]


*  Jazmin Christian [phone] -- DCF  





Daughter Aurea's email:  [email or web address]


Prognosis


Elderly frail gentleman with multisystem failure and worsening dementia; unable 

to participate in self care decisions.  Prognosis at this time appears to be 

hours to days; PPS 20


Code Status:  No Code


Plan


* Patient does not have capacity to make his own decisions and is unlikely to 

regain capacity.  Per [place] statutes, decision making would then fall to the 

majority of adult children.  Patient has three living children but one son has 

been estranged for over 20 years and no contact information is available for 

him.  The two remaining daughters are Argelia Keating and Aurea Hale, both of 

whom reside in [place].  


* Spoke with both daughters who live in [place] by phone; they are fully 

supportive of transition of goals to comfort and peaceful death.  Will consult 

hospice; change code status; implement meds for control of pain and agitation


* NO CODE (DNR/DNI)


* Discussed with nurse, Geri,  Dr. Saba Johnson and 

Steffanie, . 


* SYMPTOMS: Pain: sources include bedbound status, pressure ulcers, sacral 

decub. Pain med orders written. Dyspnea: increasing oxygen needs. Orders 

written for comfort meds. Agitation: agitated with minimal voice or contact. 

unable to answer questions or follow commands. Added Haldol and Lorazepam for 

anxiety/ agitation. 


* Palliative care will continue to follow to facilitate transition to comfort 

care





Attestation


To help prompt me to consider important information that might be impacting 

today's encounter and assessment, information from prior notes written by 

myself or my colleagues may have been "brought forward" into today's note.  My 

signature on this note, however, is an attestation that I personally performed 

the exam, history, and/or decision-making noted today, and, unless otherwise 

indicated, the interactions with patient, family, and staff as well as the 

review of records all occurred today.  I also attest that the listed assessment 

and stated plan reflect my best clinical judgment today based on the 

combination of historical information, prior notes, and today's exam/ 

interactions.  When time spent is documented, it refers only to time spent 

today by the signer, or if indicated, combined time spent today by 

collaborating physician/nurse practitioner.











Diana Benitez Jan 16, 2018 10:58

## 2018-01-16 NOTE — HHI.DS
Death Summary Note


Date of Death:  2018


Time Of Death:  1509


Admission Date


2018 at 13:07


Admitting Diagnosis





severe sepsis/A. fib with RVR


Diagnosis at Time of Death:  


(1) Hypoalbuminemia


ICD Code:  E88.09 - Other disorders of plasma-protein metabolism, not elsewhere 

classified


Diagnosis:  Secondary





(2) Elevated CPK


ICD Code:  R74.8 - Abnormal levels of other serum enzymes


Diagnosis:  Secondary





(3) Elevated troponin


ICD Code:  R74.8 - Abnormal levels of other serum enzymes


Diagnosis:  Secondary





(4) Anemia


ICD Code:  D64.9 - Anemia, unspecified


Diagnosis:  Secondary





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


ICD Code:  Z95.828 - Presence of other vascular implants and grafts


Diagnosis:  Secondary





(6) Sacral decubitus ulcer


ICD Code:  L89.159 - Pressure ulcer of sacral region, unspecified stage


Diagnosis:  Secondary





(7) Heel ulcer


ICD Code:  L97.409 - Non-pressure chronic ulcer of unspecified heel and midfoot 

with unspecified severity


Diagnosis:  Secondary





(8) Alzheimer's dementia


ICD Code:  G30.9 - Alzheimer's disease, unspecified


Diagnosis:  Principal





(9) Depression


ICD Code:  F32.9 - Major depressive disorder, single episode, unspecified


Diagnosis:  Secondary





(10) Elevated partial thromboplastin time (PTT)


ICD Code:  R79.1 - Abnormal coagulation profile


Diagnosis:  Secondary





(11) Elevated INR


ICD Code:  R79.1 - Abnormal coagulation profile


Diagnosis:  Secondary





(12) Acute hypernatremia


ICD Code:  E87.0 - Hyperosmolality and hypernatremia


Diagnosis:  Secondary





(13) Gastroesophageal reflux disease


ICD Code:  K21.9 - Gastro-esophageal reflux disease without esophagitis


Diagnosis:  Secondary





(14) Chronic anticoagulation


ICD Code:  Z79.01 - Long term (current) use of anticoagulants


Diagnosis:  Secondary





(15) Acute kidney injury


ICD Code:  N17.9 - Acute kidney failure, unspecified


Diagnosis:  Secondary





(16) Lactic acidosis


ICD Code:  E87.2 - Acidosis


Diagnosis:  Secondary





(17) Elevated levels of transaminase & lactic acid dehydrogenase


ICD Code:  R74.0 - Nonspecific elevation of levels of transaminase and lactic 

acid dehydrogenase [LDH]


Diagnosis:  Secondary





(18) Constipation


ICD Code:  K59.00 - Constipation, unspecified


Diagnosis:  Secondary





(19) Severe sepsis


ICD Code:  A41.9 - Sepsis, unspecified organism; R65.20 - Severe sepsis without 

septic shock


Diagnosis:  Secondary





(20) Atrial fibrillation with RVR


ICD Code:  I48.91 - Unspecified atrial fibrillation


Diagnosis:  Secondary





(21) Healthcare-associated pneumonia


ICD Code:  J18.9 - Pneumonia, unspecified organism


Diagnosis:  Secondary





Brief History


89-year-old  male.  Date of admission 2018.  Past medical history 

includes dementia disorder, chronic atrial fibrillation, hypertension, chronic 

anticoagulation, gastroesophageal reflux disease, constipation.  Patient is a 

resident of [facility].  Patient presents to [facility] after 

subacute onset of fevers, altered mental status.  Patient was transported per 

nursing home request.





Patient is noted to be in A. fib with RVR heart rate in the 140s.  Pelvic 

revealed a creatinine of 3.6.  Potassium of 5.8.  White blood cell count 21,

000.  Elevated INR and PTT, lactic acidosis of 9.0, elevated transaminases, 

elevated CPK and troponin and low albumin.  CT abdomen/pelvis revealed fecal 

impaction, nonobstructive bowel gas pattern in an old aortic graft stent.  

Chest x-ray revealed bilateral lower lobe pneumonia.  Patient received cefepime 

and vancomycin in the emergency department.  We are asked to admit the patient.

  He is currently awake and pleasantly confused on 3 L nasal cannula


CBC/BMP:  


18 0642                                                                   

             18 0642





Significant Findings





Laboratory Tests








Test


  18


23:00 18


23:42 18


03:35 1/15/18


06:18


 


Troponin I


  


  0.25 NG/ML


(0.02-0.05) 


  


 


 


Red Blood Count


  


  


  2.74 MIL/MM3


(4.50-5.90) 


 


 


Hemoglobin


  


  


  7.9 GM/DL


(13.0-17.0) 


 


 


Hematocrit


  


  


  24.2 %


(39.0-51.0) 


 


 


Red Cell Distribution Width


  


  


  17.4 %


(11.6-17.2) 


 


 


Platelet Count


  


  


  138 TH/MM3


(150-450) 


 


 


Neutrophils (%) (Auto)


  


  


  89.6 %


(16.0-70.0) 


 


 


Lymphocytes (%) (Auto)


  


  


  6.0 %


(9.0-44.0) 


 


 


Neutrophils # (Auto)


  


  


  9.4 TH/MM3


(1.8-7.7) 


 


 


Lymphocytes # (Auto)


  


  


  0.6 TH/MM3


(1.0-4.8) 


 


 


Prothrombin Time


  


  


  17.1 SEC


(9.8-11.6) 


 


 


Activated Partial


Thromboplast Time 


  


  37.1 SEC


(24.3-30.1) 


 


 


Blood Urea Nitrogen


  


  


  96 MG/DL


(7-18) 


 


 


Creatinine


  


  


  3.46 MG/DL


(0.60-1.30) 


 


 


Total Protein


  


  


  5.0 GM/DL


(6.4-8.2) 


 


 


Albumin


  


  


  1.7 GM/DL


(3.4-5.0) 


 


 


Calcium Level


  


  


  8.2 MG/DL


(8.5-10.1) 


 


 


Phosphorus Level


  


  


  2.3 MG/DL


(2.5-4.9) 


 


 


Aspartate Amino Transf


(AST/SGOT) 


  


  810 U/L


(15-37) 


 


 


Alanine Aminotransferase


(ALT/SGPT) 


  


  365 U/L


(12-78) 


 


 


Sodium Level


  


  


  152 MEQ/L


(136-145) 


 


 


Chloride Level


  


  


  117 MEQ/L


() 


 


 


Estimat Glomerular Filtration


Rate 


  


  17 ML/MIN


(>89) 


 


 


Lactic Acid Level


  


  


  2.3 mmol/L


(0.4-2.0) 


 


 


Ammonia


  


  


  LESS THAN 10


MCMOL/L 


 


 


Lactate Dehydrogenase


  


  


  547 U/L


() 


 


 


Total Creatine Kinase


  


  


  736 U/L


() 


 


 


Creatine Kinase MB


  


  


  8.5 NG/ML


(0.5-3.6) 


 


 


Test


  1/15/18


06:20 18


06:42 18


07:12 18


09:12


 


White Blood Count


  11.6 TH/MM3


(4.0-11.0) 18.2 TH/MM3


(4.0-11.0) 


  


 


 


Red Blood Count


  2.58 MIL/MM3


(4.50-5.90) 2.94 MIL/MM3


(4.50-5.90) 


  


 


 


Hemoglobin


  7.4 GM/DL


(13.0-17.0) 8.2 GM/DL


(13.0-17.0) 


  


 


 


Hematocrit


  22.9 %


(39.0-51.0) 26.7 %


(39.0-51.0) 


  


 


 


Red Cell Distribution Width


  17.4 %


(11.6-17.2) 17.7 %


(11.6-17.2) 


  


 


 


Platelet Count


  118 TH/MM3


(150-450) 129 TH/MM3


(150-450) 


  


 


 


Neutrophils (%) (Auto)


  88.5 %


(16.0-70.0) 90.7 %


(16.0-70.0) 


  


 


 


Lymphocytes (%) (Auto)


  5.4 %


(9.0-44.0) 5.0 %


(9.0-44.0) 


  


 


 


Neutrophils # (Auto)


  10.3 TH/MM3


(1.8-7.7) 16.5 TH/MM3


(1.8-7.7) 


  


 


 


Lymphocytes # (Auto)


  0.6 TH/MM3


(1.0-4.8) 0.9 TH/MM3


(1.0-4.8) 


  


 


 


Blood Urea Nitrogen


  90 MG/DL


(7-18) 87 MG/DL


(7-18) 


  


 


 


Creatinine


  3.12 MG/DL


(0.60-1.30) 3.06 MG/DL


(0.60-1.30) 


  


 


 


Total Protein


  4.7 GM/DL


(6.4-8.2) 4.7 GM/DL


(6.4-8.2) 


  


 


 


Albumin


  2.0 GM/DL


(3.4-5.0) 1.8 GM/DL


(3.4-5.0) 


  


 


 


Calcium Level


  8.1 MG/DL


(8.5-10.1) 7.8 MG/DL


(8.5-10.1) 


  


 


 


Aspartate Amino Transf


(AST/SGOT) 265 U/L


(15-37) 146 U/L


(15-37) 


  


 


 


Alanine Aminotransferase


(ALT/SGPT) 216 U/L


(12-78) 161 U/L


(12-78) 


  


 


 


Total Bilirubin


  1.1 MG/DL


(0.2-1.0) 1.1 MG/DL


(0.2-1.0) 


  


 


 


Chloride Level


  114 MEQ/L


() 109 MEQ/L


() 


  


 


 


Carbon Dioxide Level


  19.8 MEQ/L


(21.0-32.0) 17.6 MEQ/L


(21.0-32.0) 


  


 


 


Estimat Glomerular Filtration


Rate 19 ML/MIN


(>89) 19 ML/MIN


(>89) 


  


 


 


Total Creatine Kinase


  1098 U/L


() 


  


  826 U/L


()


 


Creatine Kinase MB


  21.8 NG/ML


(0.5-3.6) 


  


  19.8 NG/ML


(0.5-3.6)


 


Troponin I


  0.17 NG/ML


(0.02-0.05) 


  


  


 


 


Triglycerides Level


  158 MG/DL


() 


  


  


 


 


Cholesterol Level


  68 MG/DL


(120-200) 


  


  


 


 


HDL Cholesterol


  8.2 MG/DL


(40.0-60.0) 


  


  


 


 


Mean Corpuscular Hemoglobin


Concent 


  30.8 %


(32.0-36.0) 


  


 


 


Neutrophils % (Manual)  75 % (16-70)   


 


Band Neutrophils %  21 % (0-6)   


 


Lymphocytes %  2 % (9-44)   


 


Neutrophils # (Manual)


  


  17.7 TH/MM3


(1.8-7.7) 


  


 


 


Toxic Granulation  2+ (NORMAL)   


 


Ovalocytes  1+ (NORMAL)   


 


Random Glucose


  


  65 MG/DL


() 


  


 


 


Blood Gas HCO3


  


  


  18 mmol/L


(22-26) 


 


 


Blood Gas Base Excess


  


  


  -6.6 mmol/L


(-2-2) 


 


 


Arterial Blood pH


  


  


  7.35


(7.380-7.420) 


 


 


Arterial Blood Partial


Pressure CO2 


  


  34 mmHg


(38-42) 


 


 


Arterial Blood Oxygen Content


  


  


  10.2 Vol %


(12.0-20.0) 


 


 


Blood Gas Hemoglobin


  


  


  7.7 G/DL


(12.0-16.0) 


 


 


Prothrombin Time


  


  


  


  17.8 SEC


(9.8-11.6)








Imaging





Last Impressions








Chest X-Ray 18 0600 Signed





Impressions: 





 Service Date/Time:  2018 04:12 - CONCLUSION:  Diffuse 





 consolidation likely related to edema. The silhouetting of the hemidiaphragms 





 may be secondary to bilateral effusions.     Dave Keane MD 


 


Abdomen X-Ray 1/15/18 0600 Signed





Impressions: 





 Service Date/Time:  Monday, January 15, 2018 03:31 - CONCLUSION:  Suspected 





 dilatation of the right-sided colon.     Dave Keane MD 


 


Liver Ultrasound 18 0000 Signed





Impressions: 





 Service Date/Time:  2018 22:12 - CONCLUSION:  1. 

Gallstones 





 with a mildly thickened gallbladder wall. This can be correlated with any 





 clinical signs of possible cholecystitis. 2. Right renal cortical thinning 

would 





 be secondary to medical renal disease.     Dave Keane MD 


 


Abdomen/Pelvis CT 18 1206 Signed





Impressions: 





 Service Date/Time:  2018 12:40 - CONCLUSION:  1. 





 Nonobstructive bowel gas pattern which could represent a mild ileus. There is 

a 





 moderate to large amount of stool in the distal colon which could indicate 





 constipation. 2. Dense consolidation in both lower lobes concerning for 





 pneumonia. 3. The gallbladder is at the upper limits of normal in size. 4. 





 Status post aortic stent graft placement.     Mnia Morales MD 


 


Head CT 18 1046 Signed





Impressions: 





 Service Date/Time:  2018 11:31 - CONCLUSION:  No acute 





 intracranial findings. Chronic ischemic findings.     Rod Tyson MD 








Hospital Course


Patient was admitted with fever, AMS. He was found to have pneumonia and 

multisystem organ failure. He continued to decline with worsening respiratory 

distress and family elected to transition to comfort measures. They elected NO 

CODE (DNR/DNI) status. Admitted to hospice for comfort measures. He  

peacefully. 


.











Diana Benitez 2018 21:16